# Patient Record
Sex: MALE | Race: OTHER | HISPANIC OR LATINO | URBAN - METROPOLITAN AREA
[De-identification: names, ages, dates, MRNs, and addresses within clinical notes are randomized per-mention and may not be internally consistent; named-entity substitution may affect disease eponyms.]

---

## 2024-07-30 ENCOUNTER — INPATIENT (INPATIENT)
Facility: HOSPITAL | Age: 89
LOS: 2 days | Discharge: HOME | DRG: 379 | End: 2024-08-02
Attending: INTERNAL MEDICINE | Admitting: INTERNAL MEDICINE
Payer: MEDICARE

## 2024-07-30 VITALS
TEMPERATURE: 99 F | SYSTOLIC BLOOD PRESSURE: 164 MMHG | HEIGHT: 62 IN | HEART RATE: 42 BPM | DIASTOLIC BLOOD PRESSURE: 73 MMHG | WEIGHT: 179.9 LBS | RESPIRATION RATE: 14 BRPM

## 2024-07-30 DIAGNOSIS — K92.2 GASTROINTESTINAL HEMORRHAGE, UNSPECIFIED: ICD-10-CM

## 2024-07-30 LAB
ALBUMIN SERPL ELPH-MCNC: 3.9 G/DL — SIGNIFICANT CHANGE UP (ref 3.5–5)
ALP SERPL-CCNC: 106 U/L — SIGNIFICANT CHANGE UP (ref 40–120)
ALT FLD-CCNC: 30 U/L DA — SIGNIFICANT CHANGE UP (ref 10–60)
ANION GAP SERPL CALC-SCNC: 4 MMOL/L — LOW (ref 5–17)
APPEARANCE UR: CLEAR — SIGNIFICANT CHANGE UP
APTT BLD: 36.2 SEC — HIGH (ref 24.5–35.6)
AST SERPL-CCNC: 34 U/L — SIGNIFICANT CHANGE UP (ref 10–40)
BASOPHILS # BLD AUTO: 0.07 K/UL — SIGNIFICANT CHANGE UP (ref 0–0.2)
BASOPHILS # BLD AUTO: 0.08 K/UL — SIGNIFICANT CHANGE UP (ref 0–0.2)
BASOPHILS NFR BLD AUTO: 0.9 % — SIGNIFICANT CHANGE UP (ref 0–2)
BASOPHILS NFR BLD AUTO: 0.9 % — SIGNIFICANT CHANGE UP (ref 0–2)
BILIRUB SERPL-MCNC: 1.5 MG/DL — HIGH (ref 0.2–1.2)
BILIRUB UR-MCNC: NEGATIVE — SIGNIFICANT CHANGE UP
BUN SERPL-MCNC: 20 MG/DL — HIGH (ref 7–18)
CALCIUM SERPL-MCNC: 9.5 MG/DL — SIGNIFICANT CHANGE UP (ref 8.4–10.5)
CHLORIDE SERPL-SCNC: 106 MMOL/L — SIGNIFICANT CHANGE UP (ref 96–108)
CO2 SERPL-SCNC: 29 MMOL/L — SIGNIFICANT CHANGE UP (ref 22–31)
COLOR SPEC: YELLOW — SIGNIFICANT CHANGE UP
CREAT SERPL-MCNC: 1.23 MG/DL — SIGNIFICANT CHANGE UP (ref 0.5–1.3)
DIFF PNL FLD: NEGATIVE — SIGNIFICANT CHANGE UP
EGFR: 57 ML/MIN/1.73M2 — LOW
EOSINOPHIL # BLD AUTO: 0.02 K/UL — SIGNIFICANT CHANGE UP (ref 0–0.5)
EOSINOPHIL # BLD AUTO: 0.03 K/UL — SIGNIFICANT CHANGE UP (ref 0–0.5)
EOSINOPHIL NFR BLD AUTO: 0.2 % — SIGNIFICANT CHANGE UP (ref 0–6)
EOSINOPHIL NFR BLD AUTO: 0.3 % — SIGNIFICANT CHANGE UP (ref 0–6)
GLUCOSE SERPL-MCNC: 114 MG/DL — HIGH (ref 70–99)
GLUCOSE UR QL: NEGATIVE MG/DL — SIGNIFICANT CHANGE UP
HCT VFR BLD CALC: 34.4 % — LOW (ref 39–50)
HCT VFR BLD CALC: 38 % — LOW (ref 39–50)
HCT VFR BLD CALC: 39.9 % — SIGNIFICANT CHANGE UP (ref 39–50)
HGB BLD-MCNC: 11 G/DL — LOW (ref 13–17)
HGB BLD-MCNC: 12.2 G/DL — LOW (ref 13–17)
HGB BLD-MCNC: 12.5 G/DL — LOW (ref 13–17)
IMM GRANULOCYTES NFR BLD AUTO: 0.4 % — SIGNIFICANT CHANGE UP (ref 0–0.9)
IMM GRANULOCYTES NFR BLD AUTO: 0.4 % — SIGNIFICANT CHANGE UP (ref 0–0.9)
INR BLD: 1.96 RATIO — HIGH (ref 0.85–1.18)
KETONES UR-MCNC: NEGATIVE MG/DL — SIGNIFICANT CHANGE UP
LEUKOCYTE ESTERASE UR-ACNC: NEGATIVE — SIGNIFICANT CHANGE UP
LIDOCAIN IGE QN: 36 U/L — SIGNIFICANT CHANGE UP (ref 13–75)
LYMPHOCYTES # BLD AUTO: 1.45 K/UL — SIGNIFICANT CHANGE UP (ref 1–3.3)
LYMPHOCYTES # BLD AUTO: 17.9 % — SIGNIFICANT CHANGE UP (ref 13–44)
LYMPHOCYTES # BLD AUTO: 2.09 K/UL — SIGNIFICANT CHANGE UP (ref 1–3.3)
LYMPHOCYTES # BLD AUTO: 22.3 % — SIGNIFICANT CHANGE UP (ref 13–44)
MCHC RBC-ENTMCNC: 25.8 PG — LOW (ref 27–34)
MCHC RBC-ENTMCNC: 26.2 PG — LOW (ref 27–34)
MCHC RBC-ENTMCNC: 26.5 PG — LOW (ref 27–34)
MCHC RBC-ENTMCNC: 31.3 GM/DL — LOW (ref 32–36)
MCHC RBC-ENTMCNC: 32 GM/DL — SIGNIFICANT CHANGE UP (ref 32–36)
MCHC RBC-ENTMCNC: 32.1 GM/DL — SIGNIFICANT CHANGE UP (ref 32–36)
MCV RBC AUTO: 81.9 FL — SIGNIFICANT CHANGE UP (ref 80–100)
MCV RBC AUTO: 82.3 FL — SIGNIFICANT CHANGE UP (ref 80–100)
MCV RBC AUTO: 82.6 FL — SIGNIFICANT CHANGE UP (ref 80–100)
MONOCYTES # BLD AUTO: 0.84 K/UL — SIGNIFICANT CHANGE UP (ref 0–0.9)
MONOCYTES # BLD AUTO: 0.9 K/UL — SIGNIFICANT CHANGE UP (ref 0–0.9)
MONOCYTES NFR BLD AUTO: 10.3 % — SIGNIFICANT CHANGE UP (ref 2–14)
MONOCYTES NFR BLD AUTO: 9.6 % — SIGNIFICANT CHANGE UP (ref 2–14)
NEUTROPHILS # BLD AUTO: 5.71 K/UL — SIGNIFICANT CHANGE UP (ref 1.8–7.4)
NEUTROPHILS # BLD AUTO: 6.24 K/UL — SIGNIFICANT CHANGE UP (ref 1.8–7.4)
NEUTROPHILS NFR BLD AUTO: 66.5 % — SIGNIFICANT CHANGE UP (ref 43–77)
NEUTROPHILS NFR BLD AUTO: 70.3 % — SIGNIFICANT CHANGE UP (ref 43–77)
NITRITE UR-MCNC: NEGATIVE — SIGNIFICANT CHANGE UP
NRBC # BLD: 0 /100 WBCS — SIGNIFICANT CHANGE UP (ref 0–0)
PH UR: 5 — SIGNIFICANT CHANGE UP (ref 5–8)
PLATELET # BLD AUTO: 162 K/UL — SIGNIFICANT CHANGE UP (ref 150–400)
PLATELET # BLD AUTO: 180 K/UL — SIGNIFICANT CHANGE UP (ref 150–400)
PLATELET # BLD AUTO: 186 K/UL — SIGNIFICANT CHANGE UP (ref 150–400)
POTASSIUM SERPL-MCNC: 3.8 MMOL/L — SIGNIFICANT CHANGE UP (ref 3.5–5.3)
POTASSIUM SERPL-SCNC: 3.8 MMOL/L — SIGNIFICANT CHANGE UP (ref 3.5–5.3)
PROT SERPL-MCNC: 7.5 G/DL — SIGNIFICANT CHANGE UP (ref 6–8.3)
PROT UR-MCNC: 30 MG/DL
PROTHROM AB SERPL-ACNC: 21.9 SEC — HIGH (ref 9.5–13)
RBC # BLD: 4.2 M/UL — SIGNIFICANT CHANGE UP (ref 4.2–5.8)
RBC # BLD: 4.6 M/UL — SIGNIFICANT CHANGE UP (ref 4.2–5.8)
RBC # BLD: 4.85 M/UL — SIGNIFICANT CHANGE UP (ref 4.2–5.8)
RBC # FLD: 17 % — HIGH (ref 10.3–14.5)
RBC # FLD: 17 % — HIGH (ref 10.3–14.5)
RBC # FLD: 17.1 % — HIGH (ref 10.3–14.5)
SODIUM SERPL-SCNC: 139 MMOL/L — SIGNIFICANT CHANGE UP (ref 135–145)
SP GR SPEC: 1.01 — SIGNIFICANT CHANGE UP (ref 1–1.03)
UROBILINOGEN FLD QL: 0.2 MG/DL — SIGNIFICANT CHANGE UP (ref 0.2–1)
WBC # BLD: 10.17 K/UL — SIGNIFICANT CHANGE UP (ref 3.8–10.5)
WBC # BLD: 8.12 K/UL — SIGNIFICANT CHANGE UP (ref 3.8–10.5)
WBC # BLD: 9.38 K/UL — SIGNIFICANT CHANGE UP (ref 3.8–10.5)
WBC # FLD AUTO: 10.17 K/UL — SIGNIFICANT CHANGE UP (ref 3.8–10.5)
WBC # FLD AUTO: 8.12 K/UL — SIGNIFICANT CHANGE UP (ref 3.8–10.5)
WBC # FLD AUTO: 9.38 K/UL — SIGNIFICANT CHANGE UP (ref 3.8–10.5)

## 2024-07-30 PROCEDURE — 99285 EMERGENCY DEPT VISIT HI MDM: CPT

## 2024-07-30 PROCEDURE — 74177 CT ABD & PELVIS W/CONTRAST: CPT | Mod: 26,MC

## 2024-07-30 RX ORDER — BACTERIOSTATIC SODIUM CHLORIDE 0.9 %
1000 VIAL (ML) INJECTION ONCE
Refills: 0 | Status: COMPLETED | OUTPATIENT
Start: 2024-07-30 | End: 2024-07-30

## 2024-07-30 RX ORDER — ONDANSETRON HCL/PF 4 MG/2 ML
4 VIAL (ML) INJECTION EVERY 8 HOURS
Refills: 0 | Status: DISCONTINUED | OUTPATIENT
Start: 2024-07-30 | End: 2024-08-02

## 2024-07-30 RX ORDER — ACETAMINOPHEN 500 MG
650 TABLET ORAL EVERY 6 HOURS
Refills: 0 | Status: DISCONTINUED | OUTPATIENT
Start: 2024-07-30 | End: 2024-08-02

## 2024-07-30 RX ORDER — PANTOPRAZOLE SODIUM 20 MG/1
40 TABLET, DELAYED RELEASE ORAL
Refills: 0 | Status: DISCONTINUED | OUTPATIENT
Start: 2024-07-30 | End: 2024-08-02

## 2024-07-30 RX ORDER — MELATONIN 3 MG
3 TABLET ORAL AT BEDTIME
Refills: 0 | Status: DISCONTINUED | OUTPATIENT
Start: 2024-07-30 | End: 2024-08-02

## 2024-07-30 RX ORDER — MAGNESIUM, ALUMINUM HYDROXIDE 200-225/5
30 SUSPENSION, ORAL (FINAL DOSE FORM) ORAL EVERY 4 HOURS
Refills: 0 | Status: DISCONTINUED | OUTPATIENT
Start: 2024-07-30 | End: 2024-08-02

## 2024-07-30 RX ADMIN — Medication 1000 MILLILITER(S): at 20:55

## 2024-07-30 RX ADMIN — PANTOPRAZOLE SODIUM 40 MILLIGRAM(S): 20 TABLET, DELAYED RELEASE ORAL at 20:30

## 2024-07-30 RX ADMIN — Medication 1000 MILLILITER(S): at 19:28

## 2024-07-30 NOTE — H&P ADULT - PROBLEM SELECTOR PLAN 1
p/w 6-8 episodes of bloody BMs over 24 hours, no prev hx of GI bleed  CTAP: High density material layering within the descending colon, concerning for active bleeding. colonic diverticulosis.  per GI discussion with ED, bleeding likely iso diverticular bleeding  Hgb 12.5 > 12.2 > 11  HR 68, /81, s/p 1 L NS in ED  CLD  PPI IV BID  continue to monitor Hgb  GI consult in AM

## 2024-07-30 NOTE — H&P ADULT - NSHPREVIEWOFSYSTEMS_GEN_ALL_CORE
- CONSTITUTIONAL: Denies fever and chills  - HEENT: Denies changes in vision and hearing.  - RESPIRATORY: Denies SOB and cough.  - CV: Denies chest pain and palpitations  - GI: Denies abdominal pain, nausea, vomiting +bloody diarrhea  - : Denies dysuria and urinary frequency.  - SKIN: Denies rash and pruritus.  - NEUROLOGICAL: Denies headache and syncope.  - PSYCHIATRIC: Denies recent changes in mood. Denies anxiety and depression.

## 2024-07-30 NOTE — H&P ADULT - PROBLEM SELECTOR PLAN 2
hx of HTN on Valsartan-HCTZ and Lopressor  holding home BP meds iso bradycardia and GI bleed  monitor BP

## 2024-07-30 NOTE — PATIENT PROFILE ADULT - FALL HARM RISK - RISK INTERVENTIONS
Assistance OOB with selected safe patient handling equipment/Communicate Fall Risk and Risk Factors to all staff, patient, and family/Discuss with provider need for PT consult/Monitor for mental status changes/Monitor gait and stability/Provide patient with walking aids - walker, cane, crutches/Reinforce activity limits and safety measures with patient and family/Reorient to person, place and time as needed/Review medications for side effects contributing to fall risk/Sit up slowly, dangle for a short time, stand at bedside before walking/Use of alarms - bed, chair and/or voice tab/Visual Cue: Yellow wristband/Bed in lowest position, wheels locked, appropriate side rails in place/Call bell, personal items and telephone in reach/Instruct patient to call for assistance before getting out of bed or chair/Non-slip footwear when patient is out of bed/[place] to call system/Physically safe environment - no spills, clutter or unnecessary equipment/Purposeful Proactive Rounding/Room/bathroom lighting operational, light cord in reach

## 2024-07-30 NOTE — H&P ADULT - HISTORY OF PRESENT ILLNESS
88-year-old male Korean-speaking Renéebob Canales used and translation.  Patient's daughter at bedside to give collateral information.  Patient with past medical history of diabetes, hypertension, hyperlipidemia, CVA on Xarelto presents with bloody stool for 1 day.  As per daughter this morning he had a bowel movement and noticed toilet bowl filled with blood.  Denies any pain with bowel movement.  Daughter reporting stool is normal brown.  Denies similar symptoms previously.  Denies any straining with moving bowels.  No chest pain, no shortness of breath, no abdominal pain, no weakness, no dizziness  Admitted for GI Bleed, likely diverticular.  Pt is an 88-year-old male Urdu-speaking. w/ PMHx of pre-diabetes, hypertension, hyperlipidemia, CVA on Xarelto, and gout who p/w multiple bloody. liquidy bowel movements over the last 24 hours. Pt has photos of BMs and toilet bowl appears full with dark red blood and loose stool.       presents with bloody stool for 1 day.  Granddaughter at bedside providing translation. As per daughter this morning he had a bowel movement and noticed toilet bowl filled with blood.  Denies any pain with bowel movement.  Daughter reporting stool is normal brown.  Denies similar symptoms previously.  Denies any straining with moving bowels.  No chest pain, no shortness of breath, no abdominal pain, no weakness, no dizziness  Admitted for GI Bleed, likely diverticular.  Pt is an 88-year-old male AAOx3, walks independently, Argentine-speaking. w/ PMHx of pre-diabetes, hypertension, hyperlipidemia, CVA on Xarelto, and gout who p/w multiple bloody, liquidy bowel movements over the last 24 hours. Pts granddaughter at bedside has photos of BMs and toilet bowl appears full with dark red blood and loose stool. Earlier in the morning on 7/30, pt  had a bowel movement and noticed toilet bowl filled with blood.  Denies any pain with bowel movement. Denies similar symptoms previously. Denies any straining with moving bowels.  No chest pain, no shortness of breath, no abdominal pain, no weakness, no dizziness. In ED, pt reports 6-8 additional, similar BMs. Pt underwent colonoscopy once after he turned 52 and does not remember any abnormal findings at that time. Pt denies any recent abx use, fevers, chills, sick contacts, or travel. Hgb 12.5 > 12.2 in ED. Admitted for GI Bleed, likely diverticular.

## 2024-07-30 NOTE — H&P ADULT - NSICDXPASTMEDICALHX_GEN_ALL_CORE_FT
PAST MEDICAL HISTORY:  CVA (cerebrovascular accident)     Gout     Hyperlipidemia     Hypertension

## 2024-07-30 NOTE — ED PROVIDER NOTE - PROGRESS NOTE DETAILS
CT showing High density material layering within the descending colon, concerning   for active bleeding. No GI on call at [facility] at this time.  Transfer center contacted for transfer. GI contacted.  Likely diverticular bleed.  Patient hemodynamically stable.  Do not recommend transfer at this time.  Recommend patient stay here at [facility] for GI eval. Repeat CBC stable.  Will admit GI contacted.  Likely diverticular bleed.  Patient hemodynamically stable.  Do not recommend transfer at this time.  recommend holding Xarelto have patient on clear liquid diet.   Recommend patient stay here at [facility] for GI eval.

## 2024-07-30 NOTE — ED PROVIDER NOTE - OBJECTIVE STATEMENT
88-year-old male Sinhala-speaking Renée Canales used and translation.  Patient's daughter at bedside to give collateral information.  Patient with past medical history of diabetes, hypertension, hyperlipidemia, CVA on Xarelto presents with bloody stool for 1 day.  As per daughter this morning he had a bowel movement and noticed toilet bowl filled with blood.  Denies any pain with bowel movement.  Daughter reporting stool is normal brown.  Denies similar symptoms previously.  Denies any straining with moving bowels.  No chest pain, no shortness of breath, no abdominal pain, no weakness, no dizziness

## 2024-07-30 NOTE — H&P ADULT - PROBLEM SELECTOR PLAN 3
hx of gout with most recent flare up 2 weeks ago in R wrist  no swelling or erythema noted on exam today  c/w home med Colchicine 0.6 mg daily

## 2024-07-30 NOTE — H&P ADULT - ASSESSMENT
Pt is an 88-year-old male AAOx3, walks independently, Canadian-speaking. w/ PMHx of pre-diabetes, hypertension, hyperlipidemia, CVA on Xarelto, and gout who p/w multiple bloody, liquidy bowel movements over the last 24 hours. Pts granddaughter at bedside has photos of BMs and toilet bowl appears full with dark red blood and loose stool. Earlier in the morning on 7/30, pt  had a bowel movement and noticed toilet bowl filled with blood.  Denies any pain with bowel movement. Denies similar symptoms previously. Denies any straining with moving bowels.  No chest pain, no shortness of breath, no abdominal pain, no weakness, no dizziness. In ED, pt reports 6-8 additional, similar BMs. Pt underwent colonoscopy once after he turned 52 and does not remember any abnormal findings at that time. Pt denies any recent abx use, fevers, chills, sick contacts, or travel. Hgb 12.5 > 12.2 in ED. Admitted for GI Bleed, likely diverticular.     INCOMPLETE MED LIST PROVIDED BY GRANDDAUGHTER AT BEDSIDE. PRIMARY TEAM TO CONFIRM MED REC IN AM (instructed family to bring list from home in AM)

## 2024-07-30 NOTE — ED PROVIDER NOTE - CLINICAL SUMMARY MEDICAL DECISION MAKING FREE TEXT BOX
88-year-old male presents with rectal bleeding for 1 day.  Patient is on Xarelto.  Concern for active GI bleed versus possible diverticulitis.  Will get labs, CAT scan, fluids, reassess

## 2024-07-31 DIAGNOSIS — E78.5 HYPERLIPIDEMIA, UNSPECIFIED: ICD-10-CM

## 2024-07-31 DIAGNOSIS — Z75.8 OTHER PROBLEMS RELATED TO MEDICAL FACILITIES AND OTHER HEALTH CARE: ICD-10-CM

## 2024-07-31 DIAGNOSIS — I63.9 CEREBRAL INFARCTION, UNSPECIFIED: ICD-10-CM

## 2024-07-31 DIAGNOSIS — Z29.9 ENCOUNTER FOR PROPHYLACTIC MEASURES, UNSPECIFIED: ICD-10-CM

## 2024-07-31 DIAGNOSIS — K92.2 GASTROINTESTINAL HEMORRHAGE, UNSPECIFIED: ICD-10-CM

## 2024-07-31 DIAGNOSIS — M10.9 GOUT, UNSPECIFIED: ICD-10-CM

## 2024-07-31 DIAGNOSIS — Z87.438 PERSONAL HISTORY OF OTHER DISEASES OF MALE GENITAL ORGANS: ICD-10-CM

## 2024-07-31 DIAGNOSIS — Z87.898 PERSONAL HISTORY OF OTHER SPECIFIED CONDITIONS: ICD-10-CM

## 2024-07-31 DIAGNOSIS — I10 ESSENTIAL (PRIMARY) HYPERTENSION: ICD-10-CM

## 2024-07-31 LAB
ALBUMIN SERPL ELPH-MCNC: 2.9 G/DL — LOW (ref 3.5–5)
ALP SERPL-CCNC: 79 U/L — SIGNIFICANT CHANGE UP (ref 40–120)
ALT FLD-CCNC: 28 U/L DA — SIGNIFICANT CHANGE UP (ref 10–60)
ANION GAP SERPL CALC-SCNC: 6 MMOL/L — SIGNIFICANT CHANGE UP (ref 5–17)
APTT BLD: 32.2 SEC — SIGNIFICANT CHANGE UP (ref 24.5–35.6)
AST SERPL-CCNC: 33 U/L — SIGNIFICANT CHANGE UP (ref 10–40)
BILIRUB SERPL-MCNC: 1.3 MG/DL — HIGH (ref 0.2–1.2)
BUN SERPL-MCNC: 21 MG/DL — HIGH (ref 7–18)
CALCIUM SERPL-MCNC: 8.6 MG/DL — SIGNIFICANT CHANGE UP (ref 8.4–10.5)
CHLORIDE SERPL-SCNC: 108 MMOL/L — SIGNIFICANT CHANGE UP (ref 96–108)
CO2 SERPL-SCNC: 29 MMOL/L — SIGNIFICANT CHANGE UP (ref 22–31)
CREAT SERPL-MCNC: 1.11 MG/DL — SIGNIFICANT CHANGE UP (ref 0.5–1.3)
EGFR: 65 ML/MIN/1.73M2 — SIGNIFICANT CHANGE UP
GLUCOSE BLDC GLUCOMTR-MCNC: 107 MG/DL — HIGH (ref 70–99)
GLUCOSE BLDC GLUCOMTR-MCNC: 115 MG/DL — HIGH (ref 70–99)
GLUCOSE BLDC GLUCOMTR-MCNC: 118 MG/DL — HIGH (ref 70–99)
GLUCOSE SERPL-MCNC: 102 MG/DL — HIGH (ref 70–99)
HCT VFR BLD CALC: 30.6 % — LOW (ref 39–50)
HCT VFR BLD CALC: 30.8 % — LOW (ref 39–50)
HGB BLD-MCNC: 9.7 G/DL — LOW (ref 13–17)
HGB BLD-MCNC: 9.8 G/DL — LOW (ref 13–17)
INR BLD: 1.82 RATIO — HIGH (ref 0.85–1.18)
MAGNESIUM SERPL-MCNC: 1.9 MG/DL — SIGNIFICANT CHANGE UP (ref 1.6–2.6)
MCHC RBC-ENTMCNC: 26.1 PG — LOW (ref 27–34)
MCHC RBC-ENTMCNC: 26.2 PG — LOW (ref 27–34)
MCHC RBC-ENTMCNC: 31.7 GM/DL — LOW (ref 32–36)
MCHC RBC-ENTMCNC: 31.8 GM/DL — LOW (ref 32–36)
MCV RBC AUTO: 81.9 FL — SIGNIFICANT CHANGE UP (ref 80–100)
MCV RBC AUTO: 82.7 FL — SIGNIFICANT CHANGE UP (ref 80–100)
NRBC # BLD: 0 /100 WBCS — SIGNIFICANT CHANGE UP (ref 0–0)
NRBC # BLD: 0 /100 WBCS — SIGNIFICANT CHANGE UP (ref 0–0)
PHOSPHATE SERPL-MCNC: 3.6 MG/DL — SIGNIFICANT CHANGE UP (ref 2.5–4.5)
PLATELET # BLD AUTO: 141 K/UL — LOW (ref 150–400)
PLATELET # BLD AUTO: 157 K/UL — SIGNIFICANT CHANGE UP (ref 150–400)
POTASSIUM SERPL-MCNC: 3.6 MMOL/L — SIGNIFICANT CHANGE UP (ref 3.5–5.3)
POTASSIUM SERPL-SCNC: 3.6 MMOL/L — SIGNIFICANT CHANGE UP (ref 3.5–5.3)
PROT SERPL-MCNC: 5.9 G/DL — LOW (ref 6–8.3)
PROTHROM AB SERPL-ACNC: 20.4 SEC — HIGH (ref 9.5–13)
RBC # BLD: 3.7 M/UL — LOW (ref 4.2–5.8)
RBC # BLD: 3.76 M/UL — LOW (ref 4.2–5.8)
RBC # FLD: 17.1 % — HIGH (ref 10.3–14.5)
RBC # FLD: 17.2 % — HIGH (ref 10.3–14.5)
SODIUM SERPL-SCNC: 143 MMOL/L — SIGNIFICANT CHANGE UP (ref 135–145)
WBC # BLD: 7.11 K/UL — SIGNIFICANT CHANGE UP (ref 3.8–10.5)
WBC # BLD: 8.55 K/UL — SIGNIFICANT CHANGE UP (ref 3.8–10.5)
WBC # FLD AUTO: 7.11 K/UL — SIGNIFICANT CHANGE UP (ref 3.8–10.5)
WBC # FLD AUTO: 8.55 K/UL — SIGNIFICANT CHANGE UP (ref 3.8–10.5)

## 2024-07-31 PROCEDURE — 74174 CTA ABD&PLVS W/CONTRAST: CPT | Mod: 26

## 2024-07-31 RX ORDER — COLCHICINE 0.6 MG/1
0.6 TABLET, FILM COATED ORAL DAILY
Refills: 0 | Status: DISCONTINUED | OUTPATIENT
Start: 2024-07-31 | End: 2024-08-02

## 2024-07-31 RX ORDER — ATORVASTATIN CALCIUM 40 MG/1
40 TABLET, FILM COATED ORAL AT BEDTIME
Refills: 0 | Status: DISCONTINUED | OUTPATIENT
Start: 2024-07-31 | End: 2024-08-02

## 2024-07-31 RX ORDER — SOD SULF/SODIUM/NAHCO3/KCL/PEG
4000 SOLUTION, RECONSTITUTED, ORAL ORAL ONCE
Refills: 0 | Status: COMPLETED | OUTPATIENT
Start: 2024-07-31 | End: 2024-07-31

## 2024-07-31 RX ORDER — INSULIN LISPRO 100/ML
VIAL (ML) SUBCUTANEOUS AT BEDTIME
Refills: 0 | Status: DISCONTINUED | OUTPATIENT
Start: 2024-07-31 | End: 2024-08-02

## 2024-07-31 RX ORDER — ATORVASTATIN CALCIUM 40 MG/1
1 TABLET, FILM COATED ORAL
Refills: 0 | DISCHARGE

## 2024-07-31 RX ORDER — TAMSULOSIN HCL 0.4 MG
0.4 CAPSULE ORAL AT BEDTIME
Refills: 0 | Status: DISCONTINUED | OUTPATIENT
Start: 2024-07-31 | End: 2024-08-02

## 2024-07-31 RX ORDER — INSULIN LISPRO 100/ML
VIAL (ML) SUBCUTANEOUS
Refills: 0 | Status: DISCONTINUED | OUTPATIENT
Start: 2024-07-31 | End: 2024-08-02

## 2024-07-31 RX ADMIN — Medication 0.4 MILLIGRAM(S): at 21:18

## 2024-07-31 RX ADMIN — PANTOPRAZOLE SODIUM 40 MILLIGRAM(S): 20 TABLET, DELAYED RELEASE ORAL at 17:35

## 2024-07-31 RX ADMIN — Medication 3 MILLIGRAM(S): at 21:18

## 2024-07-31 RX ADMIN — PANTOPRAZOLE SODIUM 40 MILLIGRAM(S): 20 TABLET, DELAYED RELEASE ORAL at 05:31

## 2024-07-31 RX ADMIN — Medication 4000 MILLILITER(S): at 17:34

## 2024-07-31 RX ADMIN — ATORVASTATIN CALCIUM 40 MILLIGRAM(S): 40 TABLET, FILM COATED ORAL at 21:18

## 2024-07-31 NOTE — PROGRESS NOTE ADULT - ASSESSMENT
Pt is an 88-year-old male AAOx3, walks independently, Gambian-speaking. w/ PMHx of pre-diabetes, hypertension, hyperlipidemia, CVA on Xarelto, and gout who p/w multiple dark red  bloody, liquidly bowel movements over the last 24 hours.   In ED, pt reports 6-8 additional, similar BMs. Pt underwent colonoscopy once after he turned 52 and does not remember any abnormal findings at that time.  Hgb 12.5 > 12.2 in ED. Admitted for GI Bleed, likely diverticular bleed.  CT A/P shows High density material layering within the descending colon, concerning for active bleeding. Colonic diverticulosis. GI consulted.       Pt is an 88-year-old male AAOx3, walks independently, [place] Islander-speaking. w/ PMHx of pre-diabetes, hypertension, hyperlipidemia, CVA on Xarelto, and gout who p/w multiple dark red  bloody, liquidly bowel movements over the last 24 hours.   In ED, pt reports 6-8 additional, similar BMs. Pt underwent colonoscopy once after he turned 52 and does not remember any abnormal findings at that time.  Hgb 12.5 > 12.2 in ED. Admitted for GI Bleed, likely diverticular bleed.  CT A/P shows High density material layering within the descending colon, concerning for active bleeding. Colonic diverticulosis. GI consulted. Tentative colonoscopy in AM of 8/1

## 2024-07-31 NOTE — CONSULT NOTE ADULT - SUBJECTIVE AND OBJECTIVE BOX
[  ] STAT REQUEST              [ X ] ROUTINE REQUEST    Patient is a 88 year old male with Rectal bleeding. GI consulted to evaluate.          HPI:  Pt is an 88-year-old male AAOx3, walks independently, Malawian-speaking. with past medical history significant for hypertension, hyperlipidemia, CVA on Xarelto, and gout who presented to the emergency room with one day history of progressively worsening watery diarrhea mixed with blood. Patient denies abdominal pain, nausea, vomiting, hematemesis, melena, fever, chills, chest pain, SOB, cough, hematuria, dysuria, recent traveling or antibiotics intake.          PAIN MANAGEMENT:  Pain Scale:                0 /10  Pain Location:      Prior Colonoscopy: 30 years ago    PAST MEDICAL HISTORY    Gout    Hypertension    Hyperlipidemia    CVA (cerebrovascular accident)        PAST SURGICAL HISTORY    No significant surgical history reported        Allergies    No Known Allergies    Intolerances  None         MEDICATIONS  (STANDING):  atorvastatin 40 milliGRAM(s) Oral at bedtime  colchicine 0.6 milliGRAM(s) Oral daily  insulin lispro (ADMELOG) corrective regimen sliding scale   SubCutaneous three times a day before meals  insulin lispro (ADMELOG) corrective regimen sliding scale   SubCutaneous at bedtime  pantoprazole  Injectable 40 milliGRAM(s) IV Push two times a day  tamsulosin 0.4 milliGRAM(s) Oral at bedtime    MEDICATIONS  (PRN):  acetaminophen     Tablet .. 650 milliGRAM(s) Oral every 6 hours PRN Temp greater or equal to 38C (100.4F), Mild Pain (1 - 3)  aluminum hydroxide/magnesium hydroxide/simethicone Suspension 30 milliLiter(s) Oral every 4 hours PRN Dyspepsia  melatonin 3 milliGRAM(s) Oral at bedtime PRN Insomnia  ondansetron Injectable 4 milliGRAM(s) IV Push every 8 hours PRN Nausea and/or Vomiting      SOCIAL HISTORY  Advanced Directives:       [X  ] Full Code       [  ] DNR  Marital Status:         [  ] M      [ X ] S      [  ] D       [  ] W  Children:       [ X ] Yes      [  ] No  Occupation:        [  ] Employed       [X  ] Unemployed       [  ] Retired  Diet:       [ X ] Regular       [  ] PEG feeding          [  ] NG tube feeding  Drug Use:           [  X] Patient denied          [  ] Yes  Alcohol:           [ X ] No             [  ] Yes (socially)         [  ] Yes (chronic)  Tobacco:           [  ] Yes           [ X ] No      FAMILY HISTORY  [ X ] Heart Disease            [ X ] Diabetes             [ X ] HTN             [  ] Colon Cancer             [  ] Stomach Cancer              [  ] Pancreatic Cancer      VITAL SIGNS   Vital Signs Last 24 Hrs  T(C): 36.5 (24 @ 05:37), Max: 37 (24 @ 14:29)  T(F): 97.7 (24 @ 05:37), Max: 98.6 (24 @ 14:29)  HR: 61 (24 @ 05:37) (42 - 68)  BP: 126/50 (24 @ 05:37) (126/50 - 165/75)  BP(mean): 68 (24 @ 05:37) (68 - 87)  RR: 18 (24 @ 05:37) (14 - 18)  SpO2: 99% (24 @ 05:37) (98% - 100%)  Daily Height in cm: 157.48 (2024 14:29)    Daily Weight in k.1 (2024 05:37)       CBC Full  -  ( 2024 06:10 )  WBC Count : 7.11 K/uL  RBC Count : 3.70 M/uL  Hemoglobin : 9.7 g/dL  Hematocrit : 30.6 %  Platelet Count - Automated : 141 K/uL  Mean Cell Volume : 82.7 fl  Mean Cell Hemoglobin : 26.2 pg  Mean Cell Hemoglobin Concentration : 31.7 gm/dL  Auto Neutrophil # : x  Auto Lymphocyte # : x  Auto Monocyte # : x  Auto Eosinophil # : x  Auto Basophil # : x  Auto Neutrophil % : x  Auto Lymphocyte % : x  Auto Monocyte % : x  Auto Eosinophil % : x  Auto Basophil % : x          143  |  108  |  21<H>  ----------------------------<  102<H>  3.6   |  29  |  1.11    Ca    8.6      2024 06:10  Phos  3.6       Mg     1.9         TPro  5.9<L>  /  Alb  2.9<L>  /  TBili  1.3<H>  /  DBili  x   /  AST  33  /  ALT  28  /  AlkPhos  79      Lipase: 36 U/L ( @ 16:00)      PT/INR - ( 2024 06:10 )   PT: 20.4 sec;   INR: 1.82 ratio       PTT - ( 2024 06:10 )  PTT:32.2 sec      Urinalysis with Rflx Culture (24 @ 16:20)   Urine Appearance: Clear  Color: Yellow  Specific Gravity: 1.014  pH Urine: 5.0  Protein, Urine: 30 mg/dL  Glucose Qualitative, Urine: Negative mg/dL  Ketone - Urine: Negative mg/dL  Blood, Urine: Negative  Bilirubin: Negative  Urobilinogen: 0.2 mg/dL  Leukocyte Esterase Concentration: Negative  Nitrite: Negative    RADIOLOGY/IMAGING                    ACC: [id number] EXAM:  CT ABDOMEN AND PELVIS IC   ORDERED BY: KAYLA RECIO     PROCEDURE DATE:  2024          INTERPRETATION:  CLINICAL INFORMATION: Rectal bleeding, rule out   diverticulitis    COMPARISON: None.    CONTRAST/COMPLICATIONS:  IV Contrast: Omnipaque 350  90 cc administered   10 cc discarded  Oral Contrast: NONE  Complications: None reported at time of study completion    PROCEDURE:  CT of the Abdomen and Pelvis was performed.  Sagittal and coronal reformats were performed.    FINDINGS:  LOWER CHEST: Elevation of the left hemidiaphragm.    LIVER: Within normal limits.  BILE DUCTS: Normal caliber.  GALLBLADDER: Within normal limits.  SPLEEN: Within normal limits.  PANCREAS: Within normal limits.  ADRENALS: Within normal limits.  KIDNEYS/URETERS: Bilateral renal cysts. Additional left subcentimeter   hypodensities, too small to characterize. No hydronephrosis.    BLADDER: Minimally distended.  REPRODUCTIVE ORGANS: Prostate within normal limits.    BOWEL: High density layering within the descending colon. No bowel   obstruction. Colonic diverticulosis. Appendix is normal.  PERITONEUM/RETROPERITONEUM: Within normal limits.  VESSELS: Atherosclerotic changes.  LYMPH NODES: No lymphadenopathy.  ABDOMINAL WALL: Withinnormal limits.  BONES: Degenerative changes.    IMPRESSION:    High density material layering within the descending colon, concerning   for active bleeding.    Colonic diverticulosis.

## 2024-07-31 NOTE — PHARMACOTHERAPY INTERVENTION NOTE - COMMENTS
Updated the home medication list on "Outpatient Medication Review" as per the family member- Nely, who provided picture of medication bottles.

## 2024-07-31 NOTE — PHARMACOTHERAPY INTERVENTION NOTE - COMMENTS
Provided medication counseling on pt's new medications and on their side effects.  Pt is Peruvian speaking, but family member was by the bedside and preferred to interpret herself.  All the questions were answered.

## 2024-07-31 NOTE — PROGRESS NOTE ADULT - PROBLEM SELECTOR PLAN 1
p/w 6-8 episodes of bloody BMs over 24 hours, no prev hx of GI bleed  CTAP: High density material layering within the descending colon, concerning for active bleeding. colonic diverticulosis.  per GI discussion with ED, bleeding likely iso diverticular bleeding  Hgb 12.5 > 12.2 > 11 > 9.7  V/S wnl, Hemodynamically stable, however given drop in hgb  IR consulted > IR deferred consult back to GI as no acute intervention is warranted of them   NPO with PPI IV BID  Monitor H&H q6 hours  Maintain active T&S  GI consulted Dr. Mercedes p/w 6-8 episodes of bloody BMs over 24 hours, no prev hx of GI bleed  CTAP: High density material layering within the descending colon, concerning for active bleeding. colonic diverticulosis.  per GI discussion with ED, bleeding likely iso diverticular bleeding  Hgb 12.5 > 12.2 > 11 > 9.7  V/S wnl, Hemodynamically stable, however given drop in hgb  IR consulted > IR deferred consult back to GI as no acute intervention is warranted of them   F/U CT angio  CLD with PPI IV BID  Start Golytely 1 gal at 1700 today  NPO after MN for colonoscopy  Tentative colonoscopy in AM of 8/1  Monitor H&H q6 hours  Maintain active T&S  GI consulted Dr. Mercedes

## 2024-07-31 NOTE — CONSULT NOTE ADULT - SUBJECTIVE AND OBJECTIVE BOX
[  ] STAT REQUEST              [ X ] ROUTINE REQUEST    Patient is a 88 year old male with rectal bleeding. GI consulted to evaluate.         HPI:  Pt is an 88-year-old male AAOx3, walks independently, Marshallese-speaking. w/ PMHx of pre-diabetes, hypertension, hyperlipidemia, CVA on Xarelto, and gout who p/w multiple bloody, liquidy bowel movements over the last 24 hours. Pts granddaughter at bedside has photos of BMs and toilet bowl appears full with dark red blood and loose stool. Earlier in the morning on , pt  had a bowel movement and noticed toilet bowl filled with blood.  Denies any pain with bowel movement. Denies similar symptoms previously. Denies any straining with moving bowels.  No chest pain, no shortness of breath, no abdominal pain, no weakness, no dizziness. In ED, pt reports 6-8 additional, similar BMs. Pt underwent colonoscopy once after he turned 52 and does not remember any abnormal findings at that time. Pt denies any recent abx use, fevers, chills, sick contacts, or travel. Hgb 12.5 > 12.2 in ED. Admitted for GI Bleed, likely diverticular.  (2024 21:18)      PAIN MANAGEMENT:  Pain Scale:                 /10  Pain Location:      Prior Colonoscopy:    PAST MEDICAL HISTORY  Gout    Hypertension    Hyperlipidemia    CVA (cerebrovascular accident)        PAST SURGICAL HISTORY      Allergies    No Known Allergies    Intolerances        HOME MEDICATIONS    MEDICATIONS  (STANDING):  atorvastatin 40 milliGRAM(s) Oral at bedtime  colchicine 0.6 milliGRAM(s) Oral daily  insulin lispro (ADMELOG) corrective regimen sliding scale   SubCutaneous three times a day before meals  insulin lispro (ADMELOG) corrective regimen sliding scale   SubCutaneous at bedtime  pantoprazole  Injectable 40 milliGRAM(s) IV Push two times a day  tamsulosin 0.4 milliGRAM(s) Oral at bedtime    MEDICATIONS  (PRN):  acetaminophen     Tablet .. 650 milliGRAM(s) Oral every 6 hours PRN Temp greater or equal to 38C (100.4F), Mild Pain (1 - 3)  aluminum hydroxide/magnesium hydroxide/simethicone Suspension 30 milliLiter(s) Oral every 4 hours PRN Dyspepsia  melatonin 3 milliGRAM(s) Oral at bedtime PRN Insomnia  ondansetron Injectable 4 milliGRAM(s) IV Push every 8 hours PRN Nausea and/or Vomiting      SOCIAL HISTORY  Advanced Directives:       [  ] Full Code       [  ] DNR  Marital Status:         [  ] M      [  ] S      [  ] D       [  ] W  Children:       [  ] Yes      [  ] No  Occupation:        [  ] Employed       [  ] Unemployed       [  ] Retired  Diet:       [  ] Regular       [  ] PEG feeding          [  ] NG tube feeding  Drug Use:           [  ] Patient denied          [  ] Yes  Alcohol:           [  ] No             [  ] Yes (socially)         [  ] Yes (chronic)  Tobacco:           [  ] Yes           [  ] No    FAMILY HISTORY  [  ] Heart Disease            [  ] Diabetes             [  ] HTN             [  ] Colon Cancer             [  ] Stomach Cancer              [  ] Pancreatic Cancer    VITAL SIGNS   Vital Signs Last 24 Hrs  T(C): 36.5 (24 @ 05:37), Max: 37 (24 @ 14:29)  T(F): 97.7 (24 @ 05:37), Max: 98.6 (24 @ 14:29)  HR: 61 (24 @ 05:37) (42 - 68)  BP: 126/50 (24 @ 05:37) (126/50 - 165/75)  BP(mean): 68 (24 @ 05:37) (68 - 87)  RR: 18 (24 @ 05:37) (14 - 18)  SpO2: 99% (24 @ 05:37) (98% - 100%)  Daily Height in cm: 157.48 (2024 14:29)    Daily Weight in k.1 (2024 05:37)       CBC Full  -  ( 2024 06:10 )  WBC Count : 7.11 K/uL  RBC Count : 3.70 M/uL  Hemoglobin : 9.7 g/dL  Hematocrit : 30.6 %  Platelet Count - Automated : 141 K/uL  Mean Cell Volume : 82.7 fl  Mean Cell Hemoglobin : 26.2 pg  Mean Cell Hemoglobin Concentration : 31.7 gm/dL  Auto Neutrophil # : x  Auto Lymphocyte # : x  Auto Monocyte # : x  Auto Eosinophil # : x  Auto Basophil # : x  Auto Neutrophil % : x  Auto Lymphocyte % : x  Auto Monocyte % : x  Auto Eosinophil % : x  Auto Basophil % : x          143  |  108  |  21<H>  ----------------------------<  102<H>  3.6   |  29  |  1.11    Ca    8.6      2024 06:10  Phos  3.6       Mg     1.9         TPro  5.9<L>  /  Alb  2.9<L>  /  TBili  1.3<H>  /  DBili  x   /  AST  33  /  ALT  28  /  AlkPhos  79        Lipase: 36 U/L ( @ 16:00)      ALT/SGPT 28  Albumin, Serum 2.9  Alkaline Phosphatase 79  AST/SGOT 33  Bilirubin Direct --  Bilirubin Total 1.3  Indirect Bilirubin --  Hepatitis A Total --  Hepatitis B Core Antibody --  Hepatitis B Surface Antibody --  Hepatitis B Surface Antigen --  Hepatitis C Virus Interpretation --  Hepatitis C Virus Genotype --  ALT/SGPT 30  Albumin, Serum 3.9  Alkaline Phosphatase 106  AST/SGOT 34  Bilirubin Direct --  Bilirubin Total 1.5  Indirect Bilirubin --  Hepatitis A Total --  Hepatitis B Core Antibody --  Hepatitis B Surface Antibody --  Hepatitis B Surface Antigen --  Hepatitis C Virus Interpretation --  Hepatitis C Virus Genotype --          PT/INR - ( 2024 06:10 )   PT: 20.4 sec;   INR: 1.82 ratio         PTT - ( 2024 06:10 )  PTT:32.2 sec    RADIOLOGY/IMAGING

## 2024-07-31 NOTE — CONSULT NOTE ADULT - NS ATTEND AMEND GEN_ALL_CORE FT
I reviewed the above and edited where appropriate.  Chart and relevant imaging reviewed.    88M with multiple comorbidities as above, who p/w GIB, found to have high density material in descending colon on a routine CT A/P, concerning for active bleeding.    At the time of consult, patient remains hemodynamically stable with normal BP and HR. Patient also has no received any blood products.     First line therapy in a hemodynamically stable patient with active bleeding is medical resuscitation and rapid prep for colonoscopy.   Recommend continued aggressive resuscitation, type/cross RBCs, serial CBCs and admission to ICU if needed.   Transfuse PRBCs if needed (recommend 2:1:1 with platelets and factors).    Recommend holding Xarelto and reversing coagulopathy (current INR is 1.82).    Angiography/embolization deferred at this time, as most LGIBs will respond to appropriate medical/endoscopic therapy.     Please call IR if patient nor responsive to conservative/endoscopic measures, if clinical situation changes and/or new information becomes available. I reviewed the above and edited where appropriate.  Chart and relevant imaging reviewed.    88M with multiple comorbidities as above, who p/w GIB, found to have high density material in descending colon on a routine CT A/P, concerning for active bleeding.    At the time of consult, patient remains hemodynamically stable with normal BP and HR. Patient also has not received any blood products.     First line therapy in a hemodynamically stable patient with active bleeding is medical resuscitation and rapid prep for colonoscopy.   Recommend continued aggressive resuscitation, type/cross RBCs, serial CBCs and admission to ICU if needed.   Transfuse PRBCs if needed (recommend 2:1:1 with platelets and factors).    Recommend holding Xarelto and reversing coagulopathy (current INR is 1.82).    Angiography/embolization deferred at this time, as most LGIBs will respond to appropriate medical/endoscopic therapy.     Please call IR if patient not responsive to conservative/endoscopic measures, if clinical situation changes and/or new information becomes available.

## 2024-07-31 NOTE — CONSULT NOTE ADULT - ASSESSMENT
1. Diarrhea  2. Rectal bleeding  3. R/o Colitis  4. R/o Diverticular bleeding  5. Anemia with drop in H/H    Suggestions:    1. Monitor H/H  2. Transfuse PRBC as needed  3. Check stool for culture  4. NPO  5. IVF hydration  6. CT-angio  7. Protonix 40mg daily  8. Avoid NSAID  9. Monitor electrolytes  10. DVT prophylaxis

## 2024-07-31 NOTE — PROGRESS NOTE ADULT - PROBLEM SELECTOR PLAN 8
DVT ppx: SCDs iso GIB  GI ppx: PPI IV BID DVT ppx: SCDs iso GIB  GI ppx: PPI IV BID    Tentative colonoscopy in AM of 8/1

## 2024-07-31 NOTE — CONSULT NOTE ADULT - ASSESSMENT
This is an 89 yo male that has been admitted with a GI bleed, at this time the patient remains hemodynamically stable, and has not yet been transfused.     This is an 87 yo male that has been admitted with a GI bleed, at this time the patient remains hemodynamically stable, and has not yet been transfused.

## 2024-08-01 ENCOUNTER — TRANSCRIPTION ENCOUNTER (OUTPATIENT)
Age: 89
End: 2024-08-01

## 2024-08-01 LAB
A1C WITH ESTIMATED AVERAGE GLUCOSE RESULT: 6.6 % — HIGH (ref 4–5.6)
ALBUMIN SERPL ELPH-MCNC: 3.3 G/DL — LOW (ref 3.5–5)
ALP SERPL-CCNC: 75 U/L — SIGNIFICANT CHANGE UP (ref 40–120)
ALT FLD-CCNC: 28 U/L DA — SIGNIFICANT CHANGE UP (ref 10–60)
ANION GAP SERPL CALC-SCNC: 6 MMOL/L — SIGNIFICANT CHANGE UP (ref 5–17)
AST SERPL-CCNC: 34 U/L — SIGNIFICANT CHANGE UP (ref 10–40)
BILIRUB SERPL-MCNC: 1.4 MG/DL — HIGH (ref 0.2–1.2)
BUN SERPL-MCNC: 14 MG/DL — SIGNIFICANT CHANGE UP (ref 7–18)
CALCIUM SERPL-MCNC: 9.2 MG/DL — SIGNIFICANT CHANGE UP (ref 8.4–10.5)
CHLORIDE SERPL-SCNC: 106 MMOL/L — SIGNIFICANT CHANGE UP (ref 96–108)
CO2 SERPL-SCNC: 31 MMOL/L — SIGNIFICANT CHANGE UP (ref 22–31)
CREAT SERPL-MCNC: 1.15 MG/DL — SIGNIFICANT CHANGE UP (ref 0.5–1.3)
EGFR: 62 ML/MIN/1.73M2 — SIGNIFICANT CHANGE UP
ESTIMATED AVERAGE GLUCOSE: 143 MG/DL — HIGH (ref 68–114)
GLUCOSE BLDC GLUCOMTR-MCNC: 113 MG/DL — HIGH (ref 70–99)
GLUCOSE BLDC GLUCOMTR-MCNC: 122 MG/DL — HIGH (ref 70–99)
GLUCOSE BLDC GLUCOMTR-MCNC: 141 MG/DL — HIGH (ref 70–99)
GLUCOSE BLDC GLUCOMTR-MCNC: 172 MG/DL — HIGH (ref 70–99)
GLUCOSE SERPL-MCNC: 115 MG/DL — HIGH (ref 70–99)
HCT VFR BLD CALC: 28.4 % — LOW (ref 39–50)
HGB BLD-MCNC: 9.1 G/DL — LOW (ref 13–17)
MAGNESIUM SERPL-MCNC: 1.8 MG/DL — SIGNIFICANT CHANGE UP (ref 1.6–2.6)
MCHC RBC-ENTMCNC: 26.5 PG — LOW (ref 27–34)
MCHC RBC-ENTMCNC: 32 GM/DL — SIGNIFICANT CHANGE UP (ref 32–36)
MCV RBC AUTO: 82.6 FL — SIGNIFICANT CHANGE UP (ref 80–100)
NRBC # BLD: 0 /100 WBCS — SIGNIFICANT CHANGE UP (ref 0–0)
PHOSPHATE SERPL-MCNC: 2.9 MG/DL — SIGNIFICANT CHANGE UP (ref 2.5–4.5)
PLATELET # BLD AUTO: 145 K/UL — LOW (ref 150–400)
POTASSIUM SERPL-MCNC: 3.8 MMOL/L — SIGNIFICANT CHANGE UP (ref 3.5–5.3)
POTASSIUM SERPL-SCNC: 3.8 MMOL/L — SIGNIFICANT CHANGE UP (ref 3.5–5.3)
PROT SERPL-MCNC: 6 G/DL — SIGNIFICANT CHANGE UP (ref 6–8.3)
RBC # BLD: 3.44 M/UL — LOW (ref 4.2–5.8)
RBC # FLD: 17.3 % — HIGH (ref 10.3–14.5)
SODIUM SERPL-SCNC: 143 MMOL/L — SIGNIFICANT CHANGE UP (ref 135–145)
WBC # BLD: 7.48 K/UL — SIGNIFICANT CHANGE UP (ref 3.8–10.5)
WBC # FLD AUTO: 7.48 K/UL — SIGNIFICANT CHANGE UP (ref 3.8–10.5)

## 2024-08-01 PROCEDURE — 45382 COLONOSCOPY W/CONTROL BLEED: CPT

## 2024-08-01 DEVICE — CLIP RESOLUTION 360 235CM: Type: IMPLANTABLE DEVICE | Status: FUNCTIONAL

## 2024-08-01 RX ADMIN — PANTOPRAZOLE SODIUM 40 MILLIGRAM(S): 20 TABLET, DELAYED RELEASE ORAL at 18:51

## 2024-08-01 RX ADMIN — Medication 0.4 MILLIGRAM(S): at 21:59

## 2024-08-01 RX ADMIN — PANTOPRAZOLE SODIUM 40 MILLIGRAM(S): 20 TABLET, DELAYED RELEASE ORAL at 05:12

## 2024-08-01 RX ADMIN — ATORVASTATIN CALCIUM 40 MILLIGRAM(S): 40 TABLET, FILM COATED ORAL at 21:59

## 2024-08-01 NOTE — PROGRESS NOTE ADULT - PROBLEM SELECTOR PLAN 5
hx of CVA on Xarelto 15 mg daily at home  holding Xarelto iso GI bleed
hx of CVA on Xarelto 15 mg daily at home  holding Xarelto iso GI bleed

## 2024-08-01 NOTE — PROGRESS NOTE ADULT - ASSESSMENT
1. Diarrhea  2. Rectal bleeding stopped  3. R/o Colitis  4. R/o Diverticular bleeding  5. Anemia with drop in H/H    Suggestions:    1. Monitor H/H  2. Transfuse PRBC as needed  3. Check stool for culture  4. NPO  5. IVF hydration  6. Colonoscopy today  7. Protonix 40mg daily  8. Avoid NSAID  9. Monitor electrolytes  10. DVT prophylaxis

## 2024-08-01 NOTE — PROGRESS NOTE ADULT - PROBLEM SELECTOR PLAN 1
p/w 6-8 episodes of bloody BMs over 24 hours, no prev hx of GI bleed  CTAP: High density material layering within the descending colon, concerning for active bleeding. colonic diverticulosis.  per GI discussion with ED, bleeding likely iso diverticular bleeding  Hgb 12.5 > 12.2 > 11 > 9.7  V/S wnl, Hemodynamically stable, however given drop in hgb  IR consulted > IR deferred consult back to GI as no acute intervention is warranted of them   CT angio no evidence of acute GIB  PPI IV BID  Scheduled for colonoscopy today 8/1  Monitor H&H   Maintain active T&S  GI consulted Dr. Mercedes

## 2024-08-01 NOTE — DISCHARGE NOTE PROVIDER - NSDCCPCAREPLAN_GEN_ALL_CORE_FT
PRINCIPAL DISCHARGE DIAGNOSIS  Diagnosis: GI bleed  Assessment and Plan of Treatment: You presented with bright red blood per rectum. Your CTA did not show any active bleeding. You were evaluated by gastroenterologist. You had colonoscopy and it showed --------------  Your blood count level has remain stable and you did not require any blood transfusions. Continue protonix at home  Follow up with gastroenterologist outpatient.  Fllow up with Gastroenterologist for Pathology results.  Avoid NSAIDs unless your Health Care Provider tells you that it is ok (Aspirin, Ibuprofen, Advil, Motrin, Aleve).        SECONDARY DISCHARGE DIAGNOSES  Diagnosis: Gout  Assessment and Plan of Treatment: Continue home medication colhicine. Follow up with primary care provider  Call your physician if you develop pain not relieved with pain regimen, fever and or swelling/redness in your extremity (ies).      Diagnosis: Hyperlipidemia  Assessment and Plan of Treatment: Continue anticholesterol medication. Follow up with primary care provider for long term management    Diagnosis: CVA (cerebrovascular accident)  Assessment and Plan of Treatment: You were taking Xarelto at home for CVA. Your Xarelto was held during your hospital stay due to concern of bleeding. It is now safe to resume ??????????????   Continue taking anticholesterol medication at home  Follow up with primary care provider    Diagnosis: History of prediabetes  Assessment and Plan of Treatment: HgA1C this admission -------------------  Make sure you get your HgA1c checked every three months.  If you have not seen your ophthalmologist this year call for appointment.  Check your feet daily for redness, sores, or openings. Do not self treat. If no improvement in two days call your primary care physician for an appointment.  Follow up with endocrinologist to establish long term goals for your A1c and for long term management and monitoring      Diagnosis: History of BPH  Assessment and Plan of Treatment: Continue tamsulosin at home. Follow up with primary care provider  Call your doctor if you are urinating more frequently, have trouble starting to urinate, have weak stream, urine leaking or dribbling, and feeling as though bladder is not empty after urination  You can help yourself by reducing the amount of fluid you drink before going to bed, limiting the amount of alcohol & caffeine you drink   Avoid cold & allergy medication that contain decongestants or antihistamines which make BPH symptoms worse    Diagnosis: Hypertension  Assessment and Plan of Treatment: Your antihypertensive medications were held during this admission due to GI bleed. Your blood pressure has remain stable. Follow up with primary care provider to establish long term goals for your blood pressure and for long term management and monitoring     PRINCIPAL DISCHARGE DIAGNOSIS  Diagnosis: GI bleed  Assessment and Plan of Treatment: You presented with bright red blood per rectum. Your CTA did not show any active bleeding. You were evaluated by gastroenterologist. You had colonoscopy and it showed diverticula bleed with clips placed.  Your blood count level has remain stable and you did not require any blood transfusions. Continue protonix at home  Follow up with gastroenterologist outpatient.  Fllow up with Gastroenterologist for Pathology results.  Avoid NSAIDs unless your Health Care Provider tells you that it is ok (Aspirin, Ibuprofen, Advil, Motrin, Aleve).        SECONDARY DISCHARGE DIAGNOSES  Diagnosis: Hypertension  Assessment and Plan of Treatment: Your antihypertensive medications were held during this admission due to GI bleed. Your blood pressure has remain stable. Follow up with primary care provider to establish long term goals for your blood pressure and for long term management and monitoring    Diagnosis: Gout  Assessment and Plan of Treatment: Continue home medication colhicine. Follow up with primary care provider  Call your physician if you develop pain not relieved with pain regimen, fever and or swelling/redness in your extremity (ies).      Diagnosis: Hyperlipidemia  Assessment and Plan of Treatment: Continue anticholesterol medication. Follow up with primary care provider for long term management    Diagnosis: CVA (cerebrovascular accident)  Assessment and Plan of Treatment: You were taking Xarelto at home for CVA. Your Xarelto was held during your hospital stay due to concern of bleeding.   It is now safe to resume ??????????????   Continue taking anticholesterol medication at home  Follow up with primary care provider    Diagnosis: History of prediabetes  Assessment and Plan of Treatment: HgA1C this admission 6.6  Make sure you get your HgA1c checked every three months.  If you have not seen your ophthalmologist this year call for appointment.  Check your feet daily for redness, sores, or openings. Do not self treat. If no improvement in two days call your primary care physician for an appointment.  Follow up with endocrinologist to establish long term goals for your A1c and for long term management and monitoring      Diagnosis: History of BPH  Assessment and Plan of Treatment: Continue tamsulosin at home. Follow up with primary care provider  Call your doctor if you are urinating more frequently, have trouble starting to urinate, have weak stream, urine leaking or dribbling, and feeling as though bladder is not empty after urination  You can help yourself by reducing the amount of fluid you drink before going to bed, limiting the amount of alcohol & caffeine you drink   Avoid cold & allergy medication that contain decongestants or antihistamines which make BPH symptoms worse     PRINCIPAL DISCHARGE DIAGNOSIS  Diagnosis: GI bleed  Assessment and Plan of Treatment: You presented with bright red blood per rectum. Your CTA did not show any active bleeding. You were evaluated by gastroenterologist. You had colonoscopy and it showed diverticula bleed with clips placed.  Your blood count level has remain stable and you did not require any blood transfusions. Continue protonix at home  Follow up with gastroenterologist outpatient.  Fllow up with Gastroenterologist for Pathology results.  Avoid NSAIDs unless your Health Care Provider tells you that it is ok (Aspirin, Ibuprofen, Advil, Motrin, Aleve).        SECONDARY DISCHARGE DIAGNOSES  Diagnosis: Hypertension  Assessment and Plan of Treatment: Your antihypertensive medications were held during this admission due to GI bleed. Your blood pressure has remain stable. Follow up with primary care provider to establish long term goals for your blood pressure and for long term management and monitoring    Diagnosis: Gout  Assessment and Plan of Treatment: Continue home medication colhicine. Follow up with primary care provider  Call your physician if you develop pain not relieved with pain regimen, fever and or swelling/redness in your extremity (ies).      Diagnosis: Hyperlipidemia  Assessment and Plan of Treatment: Continue anticholesterol medication. Follow up with primary care provider for long term management    Diagnosis: CVA (cerebrovascular accident)  Assessment and Plan of Treatment: You were taking Xarelto at home for CVA. Your Xarelto was held during your hospital stay due to concern of bleeding.   It is now safe to resume Xarelto per GI Dr. Mercedes  Please follow up with GI Dr. Mercedes in out/pt clinic after discharge.  Continue taking anticholesterol medication at home  Follow up with primary care provider    Diagnosis: History of prediabetes  Assessment and Plan of Treatment: HgA1C this admission 6.6  Make sure you get your HgA1c checked every three months.  If you have not seen your ophthalmologist this year call for appointment.  Check your feet daily for redness, sores, or openings. Do not self treat. If no improvement in two days call your primary care physician for an appointment.  Follow up with endocrinologist to establish long term goals for your A1c and for long term management and monitoring      Diagnosis: History of BPH  Assessment and Plan of Treatment: Continue tamsulosin at home. Follow up with primary care provider  Call your doctor if you are urinating more frequently, have trouble starting to urinate, have weak stream, urine leaking or dribbling, and feeling as though bladder is not empty after urination  You can help yourself by reducing the amount of fluid you drink before going to bed, limiting the amount of alcohol & caffeine you drink   Avoid cold & allergy medication that contain decongestants or antihistamines which make BPH symptoms worse

## 2024-08-01 NOTE — PROGRESS NOTE ADULT - ASSESSMENT
Pt is an 88-year-old male AAOx3, walks independently, Icelandic-speaking. w/ PMHx of pre-diabetes, hypertension, hyperlipidemia, CVA on Xarelto, and gout who p/w multiple dark red  bloody, liquidly bowel movements over the last 24 hours.   In ED, pt reports 6-8 additional, similar BMs. Pt underwent colonoscopy once after he turned 52 and does not remember any abnormal findings at that time.  Hgb 12.5 > 12.2 in ED. Admitted for GI Bleed, likely diverticular bleed.  CT A/P shows High density material layering within the descending colon, concerning for active bleeding. Colonic diverticulosis. GI consulted. Plan for colonoscopy today  8/1

## 2024-08-01 NOTE — DISCHARGE NOTE PROVIDER - NSDCMRMEDTOKEN_GEN_ALL_CORE_FT
atorvastatin 20 mg oral tablet: 1 tab(s) orally once a day (at bedtime)  colchicine 0.6 mg oral tablet: 1 tab(s) orally once a day as needed for  gout pain  metoprolol tartrate 50 mg oral tablet: 1 tab(s) orally once a day  pantoprazole 40 mg oral delayed release tablet: 1 tab(s) orally once a day  tamsulosin 0.4 mg oral capsule: 1 cap(s) orally once a day (at bedtime)  valsartan-hydrochlorothiazide 160 mg-12.5 mg oral tablet: 1 tab(s) orally once a day  Xarelto 15 mg oral tablet: 1 tab(s) orally once a day

## 2024-08-01 NOTE — PROGRESS NOTE ADULT - PROBLEM SELECTOR PLAN 3
hx of gout with most recent flare up 2 weeks ago in R wrist  no swelling or erythema noted on exam today  c/w home med Colchicine 0.6 mg daily
hx of gout with most recent flare up 2 weeks ago in R wrist  no swelling or erythema noted on exam today  c/w home med Colchicine 0.6 mg daily

## 2024-08-01 NOTE — DISCHARGE NOTE PROVIDER - HOSPITAL COURSE
88-year-old male AAOx3, walks independently, Kyrgyz-speaking. w/ PMHx of pre-diabetes, hypertension, hyperlipidemia, CVA on Xarelto, and gout who p/w multiple dark red  bloody, liquidly bowel movements over the last 24 hours.  In ED, pt reports 6-8 additional, similar BMs. Pt underwent colonoscopy once and does not remember any abnormal findings at that time.  Hgb 12.5 > 12.2 in ED. Admitted for GI Bleed, likely diverticular bleed.  CT A/P shows High density material layering within the descending colon, concerning for active bleeding. Colonic diverticulosis. GI consulted. CTA A/P no evidence of active GI bleeding. Plan for colonoscopy today  8/1 88-year-old male AAOx3, walks independently, Setsw[place]-speaking. w/ PMHx of pre-diabetes, hypertension, hyperlipidemia, CVA on Xarelto, and gout who p/w multiple dark red  bloody, liquidly bowel movements over the last 24 hours.  In ED, pt reports 6-8 additional, similar BMs. Pt underwent colonoscopy once and does not remember any abnormal findings at that time.  Hgb 12.5 > 12.2 in ED. Admitted for GI Bleed, likely diverticular bleed.  CT A/P shows High density material layering within the descending colon, concerning for active bleeding. Colonic diverticulosis. GI consulted. CTA A/P no evidence of active GI bleeding. Plan for colonoscopy 8/1 ----------------------------    IR consulted initially for CT A/P results of concerning for active bleeding, Angiography/embolization deferred at this time, due to pt being hemodynamically stable.    Pt is medically optimized for discharge, discussed with the attending  This is just a brief hospital course, please refer to the progress notes for detailed information   88-year-old male AAOx3, walks independently, Sinhala-speaking. w/ PMHx of pre-diabetes, hypertension, hyperlipidemia, CVA on Xarelto, and gout who p/w multiple dark red  bloody, liquidly bowel movements over the last 24 hours. Patient is on Xarelto for hx CVA. Xarelto held iso GIB  In ED, pt reports 6-8 additional, similar BMs. Pt underwent colonoscopy once and does not remember any abnormal findings at that time.  Hgb 12.5 > 12.2 in ED. Admitted for GI Bleed, likely diverticular bleed.  CT A/P shows High density material layering within the descending colon, concerning for active bleeding. Colonic diverticulosis. GI consulted. CTA A/P no evidence of active GI bleeding.     IR consulted initially for CT A/P results of concerning for active bleeding, Angiography/embolization deferred at this time, due to pt being hemodynamically stable.  S/P colonoscopy 8/1 showing diverticular bleed with clips placed. Patient tolerated regular diet with no more abdominal symptoms.   Pt denies anymore Rectal bleed.    Pt is medically optimized for discharge, discussed with the attending  This is just a brief hospital course, please refer to the progress notes for detailed information   88-year-old male AAOx3, walks independently, Serbian-speaking. w/ PMHx of pre-diabetes, hypertension, hyperlipidemia, CVA on Xarelto, and gout who p/w multiple dark red  bloody, liquidly bowel movements over the last 24 hours. Patient is on Xarelto for hx CVA. Xarelto held iso GIB  In ED, pt reports 6-8 additional, similar BMs. Pt underwent colonoscopy once and does not remember any abnormal findings at that time.  Hgb 12.5 > 12.2 in ED. Admitted for GI Bleed, likely diverticular bleed.  CT A/P shows High density material layering within the descending colon, concerning for active bleeding. Colonic diverticulosis. GI consulted. CTA A/P no evidence of active GI bleeding.     IR consulted initially for CT A/P results of concerning for active bleeding, Angiography/embolization deferred at this time, due to pt being hemodynamically stable.  S/P colonoscopy 8/1 showing diverticular bleed with clips placed. Patient tolerated regular diet with no more abdominal symptoms.   Pt denies anymore Rectal bleed.    Pt is medically optimized for discharge, discussed with the attending & GI Dr. Mercedes. Per Dr. Mercedes, it is ok to restart Xarelto upon discharge, & follow up with GI in out/patient.   This is just a brief hospital course, please refer to the progress notes for detailed information

## 2024-08-01 NOTE — PROGRESS NOTE ADULT - PROBLEM SELECTOR PLAN 6
hx of pre DM ( on Metformin?)  f/u A1c  ZOHRA and FC ACHS
hx of pre DM ( on Metformin?)  f/u A1c  ZOHRA and FC ACHS

## 2024-08-01 NOTE — DISCHARGE NOTE PROVIDER - CARE PROVIDER_API CALL
Chris Mercedes  Gastroenterology  [address] [place] [address], [address]  [place] [address]  Phone: [phone]  Fax: [phone]  Follow Up Time: 2 weeks

## 2024-08-01 NOTE — PROGRESS NOTE ADULT - PROBLEM SELECTOR PLAN 2
hx of HTN on Valsartan-HCTZ and Lopressor  holding home BP meds iso bradycardia and GI bleed  monitor BP
hx of HTN on Valsartan-HCTZ and Lopressor  holding home BP meds iso bradycardia and GI bleed  monitor BP

## 2024-08-02 ENCOUNTER — TRANSCRIPTION ENCOUNTER (OUTPATIENT)
Age: 89
End: 2024-08-02

## 2024-08-02 VITALS
HEART RATE: 65 BPM | OXYGEN SATURATION: 98 % | TEMPERATURE: 99 F | RESPIRATION RATE: 17 BRPM | DIASTOLIC BLOOD PRESSURE: 64 MMHG | SYSTOLIC BLOOD PRESSURE: 134 MMHG

## 2024-08-02 LAB
ANION GAP SERPL CALC-SCNC: 4 MMOL/L — LOW (ref 5–17)
BUN SERPL-MCNC: 10 MG/DL — SIGNIFICANT CHANGE UP (ref 7–18)
CALCIUM SERPL-MCNC: 8.7 MG/DL — SIGNIFICANT CHANGE UP (ref 8.4–10.5)
CHLORIDE SERPL-SCNC: 106 MMOL/L — SIGNIFICANT CHANGE UP (ref 96–108)
CO2 SERPL-SCNC: 31 MMOL/L — SIGNIFICANT CHANGE UP (ref 22–31)
CREAT SERPL-MCNC: 1.15 MG/DL — SIGNIFICANT CHANGE UP (ref 0.5–1.3)
EGFR: 62 ML/MIN/1.73M2 — SIGNIFICANT CHANGE UP
GLUCOSE BLDC GLUCOMTR-MCNC: 104 MG/DL — HIGH (ref 70–99)
GLUCOSE BLDC GLUCOMTR-MCNC: 107 MG/DL — HIGH (ref 70–99)
GLUCOSE SERPL-MCNC: 107 MG/DL — HIGH (ref 70–99)
HCT VFR BLD CALC: 26.6 % — LOW (ref 39–50)
HGB BLD-MCNC: 8.5 G/DL — LOW (ref 13–17)
MCHC RBC-ENTMCNC: 26.3 PG — LOW (ref 27–34)
MCHC RBC-ENTMCNC: 32 GM/DL — SIGNIFICANT CHANGE UP (ref 32–36)
MCV RBC AUTO: 82.4 FL — SIGNIFICANT CHANGE UP (ref 80–100)
NRBC # BLD: 0 /100 WBCS — SIGNIFICANT CHANGE UP (ref 0–0)
PLATELET # BLD AUTO: 137 K/UL — LOW (ref 150–400)
POTASSIUM SERPL-MCNC: 3.5 MMOL/L — SIGNIFICANT CHANGE UP (ref 3.5–5.3)
POTASSIUM SERPL-SCNC: 3.5 MMOL/L — SIGNIFICANT CHANGE UP (ref 3.5–5.3)
RBC # BLD: 3.23 M/UL — LOW (ref 4.2–5.8)
RBC # FLD: 17.5 % — HIGH (ref 10.3–14.5)
SODIUM SERPL-SCNC: 141 MMOL/L — SIGNIFICANT CHANGE UP (ref 135–145)
WBC # BLD: 7.38 K/UL — SIGNIFICANT CHANGE UP (ref 3.8–10.5)
WBC # FLD AUTO: 7.38 K/UL — SIGNIFICANT CHANGE UP (ref 3.8–10.5)

## 2024-08-02 PROCEDURE — 93005 ELECTROCARDIOGRAM TRACING: CPT

## 2024-08-02 PROCEDURE — 85730 THROMBOPLASTIN TIME PARTIAL: CPT

## 2024-08-02 PROCEDURE — 80053 COMPREHEN METABOLIC PANEL: CPT

## 2024-08-02 PROCEDURE — 85025 COMPLETE CBC W/AUTO DIFF WBC: CPT

## 2024-08-02 PROCEDURE — 84100 ASSAY OF PHOSPHORUS: CPT

## 2024-08-02 PROCEDURE — 83690 ASSAY OF LIPASE: CPT

## 2024-08-02 PROCEDURE — 81001 URINALYSIS AUTO W/SCOPE: CPT

## 2024-08-02 PROCEDURE — 85027 COMPLETE CBC AUTOMATED: CPT

## 2024-08-02 PROCEDURE — C1889: CPT

## 2024-08-02 PROCEDURE — 86900 BLOOD TYPING SEROLOGIC ABO: CPT

## 2024-08-02 PROCEDURE — 74174 CTA ABD&PLVS W/CONTRAST: CPT | Mod: MC

## 2024-08-02 PROCEDURE — 86901 BLOOD TYPING SEROLOGIC RH(D): CPT

## 2024-08-02 PROCEDURE — 85610 PROTHROMBIN TIME: CPT

## 2024-08-02 PROCEDURE — 83735 ASSAY OF MAGNESIUM: CPT

## 2024-08-02 PROCEDURE — 86850 RBC ANTIBODY SCREEN: CPT

## 2024-08-02 PROCEDURE — 82962 GLUCOSE BLOOD TEST: CPT

## 2024-08-02 PROCEDURE — 80048 BASIC METABOLIC PNL TOTAL CA: CPT

## 2024-08-02 PROCEDURE — 83036 HEMOGLOBIN GLYCOSYLATED A1C: CPT

## 2024-08-02 PROCEDURE — 74177 CT ABD & PELVIS W/CONTRAST: CPT | Mod: MC

## 2024-08-02 PROCEDURE — 36415 COLL VENOUS BLD VENIPUNCTURE: CPT

## 2024-08-02 PROCEDURE — 99285 EMERGENCY DEPT VISIT HI MDM: CPT | Mod: 25

## 2024-08-02 RX ADMIN — COLCHICINE 0.6 MILLIGRAM(S): 0.6 TABLET, FILM COATED ORAL at 11:20

## 2024-08-02 RX ADMIN — PANTOPRAZOLE SODIUM 40 MILLIGRAM(S): 20 TABLET, DELAYED RELEASE ORAL at 05:17

## 2024-08-02 NOTE — PROGRESS NOTE ADULT - SUBJECTIVE AND OBJECTIVE BOX
INTERVAL HPI/OVERNIGHT EVENTS:  Patient seen,no acute bleeding,stable  VITAL SIGNS:  T(F): 98.1 (08-02-24 @ 05:20)  HR: 81 (08-02-24 @ 05:20)  BP: 146/62 (08-02-24 @ 05:20)  RR: 17 (08-02-24 @ 05:20)  SpO2: 97% (08-02-24 @ 05:20)  Wt(kg): --    PHYSICAL EXAM:  awake,comfortable  Constitutional:  Eyes:  ENMT:perrla  Neck:  Respiratory:clear  Cardiovascular:ss2,m-none  Gastrointestinal:soft,bs pos  Extremities:  Vascular:  Neurological:no focal deficit  Musculoskeletal:    MEDICATIONS  (STANDING):  atorvastatin 40 milliGRAM(s) Oral at bedtime  colchicine 0.6 milliGRAM(s) Oral daily  insulin lispro (ADMELOG) corrective regimen sliding scale   SubCutaneous three times a day before meals  insulin lispro (ADMELOG) corrective regimen sliding scale   SubCutaneous at bedtime  pantoprazole  Injectable 40 milliGRAM(s) IV Push two times a day  tamsulosin 0.4 milliGRAM(s) Oral at bedtime    MEDICATIONS  (PRN):  acetaminophen     Tablet .. 650 milliGRAM(s) Oral every 6 hours PRN Temp greater or equal to 38C (100.4F), Mild Pain (1 - 3)  aluminum hydroxide/magnesium hydroxide/simethicone Suspension 30 milliLiter(s) Oral every 4 hours PRN Dyspepsia  melatonin 3 milliGRAM(s) Oral at bedtime PRN Insomnia  ondansetron Injectable 4 milliGRAM(s) IV Push every 8 hours PRN Nausea and/or Vomiting      Allergies    No Known Allergies    Intolerances        LABS:                        8.5    7.38  )-----------( 137      ( 02 Aug 2024 05:26 )             26.6     08-02    141  |  106  |  10  ----------------------------<  107<H>  3.5   |  31  |  1.15    Ca    8.7      02 Aug 2024 05:26  Phos  2.9     08-01  Mg     1.8     08-01    TPro  6.0  /  Alb  3.3<L>  /  TBili  1.4<H>  /  DBili  x   /  AST  34  /  ALT  28  /  AlkPhos  75  08-01      Urinalysis Basic - ( 02 Aug 2024 05:26 )    Color: x / Appearance: x / SG: x / pH: x  Gluc: 107 mg/dL / Ketone: x  / Bili: x / Urobili: x   Blood: x / Protein: x / Nitrite: x   Leuk Esterase: x / RBC: x / WBC x   Sq Epi: x / Non Sq Epi: x / Bacteria: x        RADIOLOGY & ADDITIONAL TESTS:        Assessment and Plan:   · Assessment	  Pt is an 88-year-old male AAOx3, walks independently, Nauruan-speaking. w/ PMHx of pre-diabetes, hypertension, hyperlipidemia, CVA on Xarelto, and gout who p/w multiple dark red  bloody, liquidly bowel movements over the last 24 hours.   In ED, pt reports 6-8 additional, similar BMs. Pt underwent colonoscopy once after he turned 52 and does not remember any abnormal findings at that time.  Hgb 12.5 > 12.2 in ED. Admitted for GI Bleed, likely diverticular bleed.  CT A/P shows High density material layering within the descending colon, concerning for active bleeding. Colonic diverticulosis. GI consulted. Plan for colonoscopy today  8/1             Problem/Plan - 1:  ·  Problem: GI bleed-stable clinically  ·PPI IV BID  s/p colonoscopy   Monitor H&H   Maintain active T&S  GI consulted Dr. Mercedes.     Problem/Plan - 2:  ·  Problem: Hypertension.   ·  Plan: hx of HTN on Valsartan-HCTZ and Lopressor  holding home BP meds iso bradycardia and GI bleed  monitor BP.     Problem/Plan - 3:  ·  Problem: Gout.   ·  Plan: hx of gout with most recent flare up 2 weeks ago in R wrist  no swelling or erythema noted on exam today  c/w home med Colchicine 0.6 mg daily.     Problem/Plan - 4:  ·  Problem: Hyperlipidemia.   ·  Plan: hx of HLD on Atorvastatin at home  c/w home med.     Problem/Plan - 5:  ·  Problem: CVA (cerebrovascular accident).   ·  Plan: hx of CVA on Xarelto 15 mg daily at home  holding Xarelto iso GI bleed.     Problem/Plan - 6:  ·  Problem: History of prediabetes.   ·  Plan: hx of pre DM ( on Metformin?)  f/u A1c  ZOHRA and FC ACHS.     Problem/Plan - 7:  ·  Problem: History of BPH.   ·  Plan: c/w home med Tamsulosin 0.4 mg qd.     Problem/Plan - 8:  ·  Problem: Prophylactic measure.   ·  Plan: DVT ppx: SCDs iso GIB  GI ppx: PPI IV BID    For colonoscopy today.     Problem/Plan - 9:  ·  Problem: Discharge planning issues.   ·  Plan: Pt is from home  Pending GI final recs after colonoscopy.    
NP Note discussed with  primary attending    Patient is a 88y old  Male who presents with a chief complaint of     INTERVAL HPI/OVERNIGHT EVENTS: no new complaints    MEDICATIONS  (STANDING):  atorvastatin 40 milliGRAM(s) Oral at bedtime  colchicine 0.6 milliGRAM(s) Oral daily  insulin lispro (ADMELOG) corrective regimen sliding scale   SubCutaneous three times a day before meals  insulin lispro (ADMELOG) corrective regimen sliding scale   SubCutaneous at bedtime  pantoprazole  Injectable 40 milliGRAM(s) IV Push two times a day  tamsulosin 0.4 milliGRAM(s) Oral at bedtime    MEDICATIONS  (PRN):  acetaminophen     Tablet .. 650 milliGRAM(s) Oral every 6 hours PRN Temp greater or equal to 38C (100.4F), Mild Pain (1 - 3)  aluminum hydroxide/magnesium hydroxide/simethicone Suspension 30 milliLiter(s) Oral every 4 hours PRN Dyspepsia  melatonin 3 milliGRAM(s) Oral at bedtime PRN Insomnia  ondansetron Injectable 4 milliGRAM(s) IV Push every 8 hours PRN Nausea and/or Vomiting      __________________________________________________  REVIEW OF SYSTEMS:    CONSTITUTIONAL: No fever,   EYES: no acute visual disturbances  NECK: No pain or stiffness  RESPIRATORY: No cough; No shortness of breath  CARDIOVASCULAR: No chest pain, no palpitations  GASTROINTESTINAL: No pain. No nausea or vomiting; No diarrhea   NEUROLOGICAL: No headache or numbness, no tremors  MUSCULOSKELETAL: No joint pain, no muscle pain  GENITOURINARY: no dysuria, no frequency, no hesitancy  PSYCHIATRY: no depression , no anxiety  ALL OTHER  ROS negative        Vital Signs Last 24 Hrs  T(C): 36.3 (01 Aug 2024 05:22), Max: 36.9 (31 Jul 2024 14:00)  T(F): 97.4 (01 Aug 2024 05:22), Max: 98.5 (31 Jul 2024 14:00)  HR: 62 (01 Aug 2024 05:22) (56 - 67)  BP: 135/52 (01 Aug 2024 05:22) (131/70 - 144/74)  BP(mean): 772 (01 Aug 2024 05:22) (82 - 772)  RR: 18 (01 Aug 2024 05:22) (17 - 18)  SpO2: 95% (01 Aug 2024 05:22) (95% - 99%)    Parameters below as of 01 Aug 2024 05:22  Patient On (Oxygen Delivery Method): room air        ________________________________________________  PHYSICAL EXAM:  GENERAL: NAD  HEENT: Normocephalic;  conjunctivae and sclerae clear; moist mucous membranes;   NECK : supple  CHEST/LUNG: Clear to ausculitation bilaterally with good air entry   HEART: S1 S2  regular; no murmurs, gallops or rubs  ABDOMEN: Soft, Nontender, Nondistended; Bowel sounds present  EXTREMITIES: no cyanosis; no edema; no calf tenderness  SKIN: warm and dry; no rash  NERVOUS SYSTEM:  Awake and alert; Oriented  to place, person and time ; no new deficits    _________________________________________________  LABS:                        9.1    7.48  )-----------( 145      ( 01 Aug 2024 06:20 )             28.4     08-01    143  |  106  |  14  ----------------------------<  115<H>  3.8   |  31  |  1.15    Ca    9.2      01 Aug 2024 06:20  Phos  2.9     08-01  Mg     1.8     08-01    TPro  6.0  /  Alb  3.3<L>  /  TBili  1.4<H>  /  DBili  x   /  AST  34  /  ALT  28  /  AlkPhos  75  08-01    PT/INR - ( 31 Jul 2024 06:10 )   PT: 20.4 sec;   INR: 1.82 ratio         PTT - ( 31 Jul 2024 06:10 )  PTT:32.2 sec  Urinalysis Basic - ( 01 Aug 2024 06:20 )    Color: x / Appearance: x / SG: x / pH: x  Gluc: 115 mg/dL / Ketone: x  / Bili: x / Urobili: x   Blood: x / Protein: x / Nitrite: x   Leuk Esterase: x / RBC: x / WBC x   Sq Epi: x / Non Sq Epi: x / Bacteria: x      CAPILLARY BLOOD GLUCOSE      POCT Blood Glucose.: 113 mg/dL (01 Aug 2024 12:05)  POCT Blood Glucose.: 141 mg/dL (01 Aug 2024 07:52)  POCT Blood Glucose.: 118 mg/dL (31 Jul 2024 21:10)  POCT Blood Glucose.: 115 mg/dL (31 Jul 2024 16:39)        RADIOLOGY & ADDITIONAL TESTS:  < from: CT Angio Abdomen and Pelvis w/ IV Cont (07.31.24 @ 19:13) >    IMPRESSION:  No evidence of active GI bleeding on this examination.        < end of copied text >    Imaging Personally Reviewed:  YES    Consultant(s) Notes Reviewed:   YES    Care Discussed with Consultants :     Plan of care was discussed with patient and /or primary care giver; all questions and concerns were addressed and care was aligned with patient's wishes.    
[   ] ICU                                          [   ] CCU                                      [ X  ] Medical Floor    Patient is a 88 year old male with Rectal bleeding. GI consulted to evaluate.           Pt is an 88-year-old male AAOx3, walks independently, Czech-speaking. with past medical history significant for hypertension, hyperlipidemia, CVA on Xarelto, and gout who presented to the emergency room with one day history of progressively worsening watery diarrhea mixed with blood. Patient denies abdominal pain, nausea, vomiting, hematemesis, melena, fever, chills, chest pain, SOB, cough, hematuria, dysuria, recent traveling or antibiotics intake.       Patient appears comfortable. No new complaints reported, No abdominal pain, N/V, hematemesis, hematochezia, melena, fever, chills, chest pain, SOB, cough or diarrhea reported.       PAIN MANAGEMENT:  Pain Scale:                0 /10  Pain Location:      Prior Colonoscopy: 30 years ago      PAST MEDICAL HISTORY    Gout    Hypertension    Hyperlipidemia    CVA (cerebrovascular accident)        PAST SURGICAL HISTORY    No significant surgical history reported        Allergies    No Known Allergies    Intolerances  None         MEDICATIONS  (STANDING):  atorvastatin 40 milliGRAM(s) Oral at bedtime  colchicine 0.6 milliGRAM(s) Oral daily  insulin lispro (ADMELOG) corrective regimen sliding scale   SubCutaneous three times a day before meals  insulin lispro (ADMELOG) corrective regimen sliding scale   SubCutaneous at bedtime  pantoprazole  Injectable 40 milliGRAM(s) IV Push two times a day  tamsulosin 0.4 milliGRAM(s) Oral at bedtime    MEDICATIONS  (PRN):  acetaminophen     Tablet .. 650 milliGRAM(s) Oral every 6 hours PRN Temp greater or equal to 38C (100.4F), Mild Pain (1 - 3)  aluminum hydroxide/magnesium hydroxide/simethicone Suspension 30 milliLiter(s) Oral every 4 hours PRN Dyspepsia  melatonin 3 milliGRAM(s) Oral at bedtime PRN Insomnia  ondansetron Injectable 4 milliGRAM(s) IV Push every 8 hours PRN Nausea and/or Vomiting      SOCIAL HISTORY  Advanced Directives:       [X  ] Full Code       [  ] DNR  Marital Status:         [  ] M      [ X ] S      [  ] D       [  ] W  Children:       [ X ] Yes      [  ] No  Occupation:        [  ] Employed       [X  ] Unemployed       [  ] Retired  Diet:       [ X ] Regular       [  ] PEG feeding          [  ] NG tube feeding  Drug Use:           [  X] Patient denied          [  ] Yes  Alcohol:           [ X ] No             [  ] Yes (socially)         [  ] Yes (chronic)  Tobacco:           [  ] Yes           [ X ] No      FAMILY HISTORY  [ X ] Heart Disease            [ X ] Diabetes             [ X ] HTN             [  ] Colon Cancer             [  ] Stomach Cancer              [  ] Pancreatic Cancer        VITALS   Vital Signs Last 24 Hrs  T(C): 36.3 (08-01-24 @ 05:22), Max: 36.9 (07-31-24 @ 14:00)  T(F): 97.4 (08-01-24 @ 05:22), Max: 98.5 (07-31-24 @ 14:00)  HR: 62 (08-01-24 @ 05:22) (56 - 67)  BP: 135/52 (08-01-24 @ 05:22) (131/70 - 144/74)  BP(mean): 772 (08-01-24 @ 05:22) (82 - 772)  RR: 18 (08-01-24 @ 05:22) (17 - 18)  SpO2: 95% (08-01-24 @ 05:22) (95% - 99%)      MEDICATIONS  (STANDING):  atorvastatin 40 milliGRAM(s) Oral at bedtime  colchicine 0.6 milliGRAM(s) Oral daily  insulin lispro (ADMELOG) corrective regimen sliding scale   SubCutaneous three times a day before meals  insulin lispro (ADMELOG) corrective regimen sliding scale   SubCutaneous at bedtime  pantoprazole  Injectable 40 milliGRAM(s) IV Push two times a day  tamsulosin 0.4 milliGRAM(s) Oral at bedtime    MEDICATIONS  (PRN):  acetaminophen     Tablet .. 650 milliGRAM(s) Oral every 6 hours PRN Temp greater or equal to 38C (100.4F), Mild Pain (1 - 3)  aluminum hydroxide/magnesium hydroxide/simethicone Suspension 30 milliLiter(s) Oral every 4 hours PRN Dyspepsia  melatonin 3 milliGRAM(s) Oral at bedtime PRN Insomnia  ondansetron Injectable 4 milliGRAM(s) IV Push every 8 hours PRN Nausea and/or Vomiting                            9.1    7.48  )-----------( 145      ( 01 Aug 2024 06:20 )             28.4       08-01    143  |  106  |  14  ----------------------------<  115<H>  3.8   |  31  |  1.15    Ca    9.2      01 Aug 2024 06:20  Phos  2.9     08-01  Mg     1.8     08-01    TPro  6.0  /  Alb  3.3<L>  /  TBili  1.4<H>  /  DBili  x   /  AST  34  /  ALT  28  /  AlkPhos  75  08-01      PT/INR - ( 31 Jul 2024 06:10 )   PT: 20.4 sec;   INR: 1.82 ratio         PTT - ( 31 Jul 2024 06:10 )  PTT:32.2 sec      ACC: [id number] EXAM:  CT ANGIO ABD PELV (W)AW IC   ORDERED BY: HERNANDO DONAHUE     PROCEDURE DATE:  07/31/2024          INTERPRETATION:  CLINICAL INFORMATION: GI bleed.    COMPARISON: CT abdomen pelvis 7/30/2024..    CONTRAST/COMPLICATIONS:  IV Contrast: Omnipaque 350  90 cc administered   10 cc discarded  Oral Contrast: NONE  Complications: None reported at time of study completion    PROCEDURE:  CT of the Abdomen and Pelvis was performed.  Precontrast, Arterial and Delayed phases were performed.  Sagittal and coronal reformats were performed.    FINDINGS:  LOWER CHEST: Elevated left hemidiaphragm. Cardiomegaly.    LIVER: Within normal limits.  BILE DUCTS: Normal caliber.  GALLBLADDER: Vicarious excretion of contrast.  SPLEEN: Within normal limits.  PANCREAS: Within normal limits.  ADRENALS: Within normal limits.  KIDNEYS/URETERS: No hydronephrosis. Excreted contrast in the collecting   systems and ureters. Bilateral renal cysts.    BLADDER: Within normal limits.  REPRODUCTIVE ORGANS: Prostate within normal limits.    BOWEL: No bowel obstruction. Appendix is normal. Scattered colonic   diverticula. No evidence of contrast extravasation.  PERITONEUM/RETROPERITONEUM: No ascites.  VESSELS: Atherosclerotic changes.  LYMPH NODES: No lymphadenopathy.  ABDOMINAL WALL: Within normal limits.  BONES: Degenerative changes.    IMPRESSION:  No evidence of active GI bleeding on this examination.    
NP Note discussed with  primary attending    Patient is a 88y old  Male who presents with a chief complaint of     INTERVAL HPI/OVERNIGHT EVENTS: no new complaints    MEDICATIONS  (STANDING):  atorvastatin 40 milliGRAM(s) Oral at bedtime  colchicine 0.6 milliGRAM(s) Oral daily  insulin lispro (ADMELOG) corrective regimen sliding scale   SubCutaneous three times a day before meals  insulin lispro (ADMELOG) corrective regimen sliding scale   SubCutaneous at bedtime  pantoprazole  Injectable 40 milliGRAM(s) IV Push two times a day  tamsulosin 0.4 milliGRAM(s) Oral at bedtime    MEDICATIONS  (PRN):  acetaminophen     Tablet .. 650 milliGRAM(s) Oral every 6 hours PRN Temp greater or equal to 38C (100.4F), Mild Pain (1 - 3)  aluminum hydroxide/magnesium hydroxide/simethicone Suspension 30 milliLiter(s) Oral every 4 hours PRN Dyspepsia  melatonin 3 milliGRAM(s) Oral at bedtime PRN Insomnia  ondansetron Injectable 4 milliGRAM(s) IV Push every 8 hours PRN Nausea and/or Vomiting      __________________________________________________  REVIEW OF SYSTEMS:    CONSTITUTIONAL: No fever,   EYES: no acute visual disturbances  NECK: No pain or stiffness  RESPIRATORY: No cough; No shortness of breath  CARDIOVASCULAR: No chest pain, no palpitations  GASTROINTESTINAL: No pain. No nausea or vomiting; No diarrhea   NEUROLOGICAL: No headache or numbness, no tremors  MUSCULOSKELETAL: No joint pain, no muscle pain  GENITOURINARY: no dysuria, no frequency, no hesitancy  PSYCHIATRY: no depression , no anxiety  ALL OTHER  ROS negative        Vital Signs Last 24 Hrs  T(C): 36.5 (31 Jul 2024 05:37), Max: 37 (30 Jul 2024 14:29)  T(F): 97.7 (31 Jul 2024 05:37), Max: 98.6 (30 Jul 2024 14:29)  HR: 61 (31 Jul 2024 05:37) (42 - 68)  BP: 126/50 (31 Jul 2024 05:37) (126/50 - 165/75)  BP(mean): 68 (31 Jul 2024 05:37) (68 - 87)  RR: 18 (31 Jul 2024 05:37) (14 - 18)  SpO2: 99% (31 Jul 2024 05:37) (98% - 100%)    Parameters below as of 30 Jul 2024 23:11  Patient On (Oxygen Delivery Method): room air        ________________________________________________  PHYSICAL EXAM:  GENERAL: NAD  HEENT: Normocephalic;  conjunctivae and sclerae clear; moist mucous membranes;   NECK : supple  CHEST/LUNG: Clear to auscultation bilaterally with good air entry   HEART: S1 S2  regular; no murmurs, gallops or rubs  ABDOMEN: Soft, Nontender, Nondistended; Bowel sounds present  EXTREMITIES: no cyanosis; no edema; no calf tenderness  SKIN: warm and dry; no rash  NERVOUS SYSTEM:  Awake and alert; Oriented  to place, person and time ; no new deficits    _________________________________________________  LABS:                        9.7    7.11  )-----------( 141      ( 31 Jul 2024 06:10 )             30.6     07-31    143  |  108  |  21<H>  ----------------------------<  102<H>  3.6   |  29  |  1.11    Ca    8.6      31 Jul 2024 06:10  Phos  3.6     07-31  Mg     1.9     07-31    TPro  5.9<L>  /  Alb  2.9<L>  /  TBili  1.3<H>  /  DBili  x   /  AST  33  /  ALT  28  /  AlkPhos  79  07-31    PT/INR - ( 31 Jul 2024 06:10 )   PT: 20.4 sec;   INR: 1.82 ratio         PTT - ( 31 Jul 2024 06:10 )  PTT:32.2 sec  Urinalysis Basic - ( 31 Jul 2024 06:10 )    Color: x / Appearance: x / SG: x / pH: x  Gluc: 102 mg/dL / Ketone: x  / Bili: x / Urobili: x   Blood: x / Protein: x / Nitrite: x   Leuk Esterase: x / RBC: x / WBC x   Sq Epi: x / Non Sq Epi: x / Bacteria: x      CAPILLARY BLOOD GLUCOSE      POCT Blood Glucose.: 122 mg/dL (30 Jul 2024 14:59)        RADIOLOGY & ADDITIONAL TESTS:  < from: CT Abdomen and Pelvis w/ IV Cont (07.30.24 @ 17:59) >  IMPRESSION:    High density material layering within the descending colon, concerning   for active bleeding.    Colonic diverticulosis.    < end of copied text >    Imaging Personally Reviewed:  YES    Consultant(s) Notes Reviewed:   YES    Care Discussed with Consultants :     Plan of care was discussed with patient and /or primary care giver; all questions and concerns were addressed and care was aligned with patient's wishes.    
 [   ] ICU                                          [   ] CCU                                      [ X  ] Medical Floor    Patient is a 88 year old male with Rectal bleeding. GI consulted to evaluate.           Pt is an 88-year-old male AAOx3, walks independently, Ecuadorean-speaking. with past medical history significant for hypertension, hyperlipidemia, CVA on Xarelto, and gout who presented to the emergency room with one day history of progressively worsening watery diarrhea mixed with blood. Patient denies abdominal pain, nausea, vomiting, hematemesis, melena, fever, chills, chest pain, SOB, cough, hematuria, dysuria, recent traveling or antibiotics intake.       Patient appears comfortable. No new complaints reported, No abdominal pain, N/V, hematemesis, hematochezia, melena, fever, chills, chest pain, SOB, cough or diarrhea reported.       PAIN MANAGEMENT:  Pain Scale:                0 /10  Pain Location:      Prior Colonoscopy: 30 years ago      PAST MEDICAL HISTORY    Gout    Hypertension    Hyperlipidemia    CVA (cerebrovascular accident)        PAST SURGICAL HISTORY    No significant surgical history reported        Allergies    No Known Allergies    Intolerances  None         SOCIAL HISTORY  Advanced Directives:       [X  ] Full Code       [  ] DNR  Marital Status:         [  ] M      [ X ] S      [  ] D       [  ] W  Children:       [ X ] Yes      [  ] No  Occupation:        [  ] Employed       [X  ] Unemployed       [  ] Retired  Diet:       [ X ] Regular       [  ] PEG feeding          [  ] NG tube feeding  Drug Use:           [  X] Patient denied          [  ] Yes  Alcohol:           [ X ] No             [  ] Yes (socially)         [  ] Yes (chronic)  Tobacco:           [  ] Yes           [ X ] No      FAMILY HISTORY  [ X ] Heart Disease            [ X ] Diabetes             [ X ] HTN             [  ] Colon Cancer             [  ] Stomach Cancer              [  ] Pancreatic Cancer        VITALS   Vital Signs Last 24 Hrs  T(C): 36.7 (08-02-24 @ 05:20), Max: 36.9 (08-01-24 @ 21:00)  T(F): 98.1 (08-02-24 @ 05:20), Max: 98.5 (08-01-24 @ 21:00)  HR: 81 (08-02-24 @ 05:20) (64 - 91)  BP: 146/62 (08-02-24 @ 05:20) (84/67 - 160/95)  BP(mean): 90 (08-02-24 @ 05:20) (70 - 90)  RR: 17 (08-02-24 @ 05:20) (17 - 24)  SpO2: 97% (08-02-24 @ 05:20) (93% - 100%)        MEDICATIONS  (STANDING):  atorvastatin 40 milliGRAM(s) Oral at bedtime  colchicine 0.6 milliGRAM(s) Oral daily  insulin lispro (ADMELOG) corrective regimen sliding scale   SubCutaneous three times a day before meals  insulin lispro (ADMELOG) corrective regimen sliding scale   SubCutaneous at bedtime  pantoprazole  Injectable 40 milliGRAM(s) IV Push two times a day  tamsulosin 0.4 milliGRAM(s) Oral at bedtime    MEDICATIONS  (PRN):  acetaminophen     Tablet .. 650 milliGRAM(s) Oral every 6 hours PRN Temp greater or equal to 38C (100.4F), Mild Pain (1 - 3)  aluminum hydroxide/magnesium hydroxide/simethicone Suspension 30 milliLiter(s) Oral every 4 hours PRN Dyspepsia  melatonin 3 milliGRAM(s) Oral at bedtime PRN Insomnia  ondansetron Injectable 4 milliGRAM(s) IV Push every 8 hours PRN Nausea and/or Vomiting                            8.5    7.38  )-----------( 137      ( 02 Aug 2024 05:26 )             26.6       08-02    141  |  106  |  10  ----------------------------<  107<H>  3.5   |  31  |  1.15    Ca    8.7      02 Aug 2024 05:26  Phos  2.9     08-01  Mg     1.8     08-01    TPro  6.0  /  Alb  3.3<L>  /  TBili  1.4<H>  /  DBili  x   /  AST  34  /  ALT  28  /  AlkPhos  75  08-01

## 2024-08-02 NOTE — DIETITIAN INITIAL EVALUATION ADULT - ORAL NUTRITION SUPPLEMENTS
Recommend Ensure Clear (provides 240 kcal, 8 gm protein per 8oz serving) 2 x day to aid in inadequate energy intake >3 days

## 2024-08-02 NOTE — DIETITIAN INITIAL EVALUATION ADULT - PERTINENT MEDS FT
MEDICATIONS  (STANDING):  atorvastatin 40 milliGRAM(s) Oral at bedtime  colchicine 0.6 milliGRAM(s) Oral daily  insulin lispro (ADMELOG) corrective regimen sliding scale   SubCutaneous three times a day before meals  insulin lispro (ADMELOG) corrective regimen sliding scale   SubCutaneous at bedtime  pantoprazole  Injectable 40 milliGRAM(s) IV Push two times a day  tamsulosin 0.4 milliGRAM(s) Oral at bedtime    MEDICATIONS  (PRN):  acetaminophen     Tablet .. 650 milliGRAM(s) Oral every 6 hours PRN Temp greater or equal to 38C (100.4F), Mild Pain (1 - 3)  aluminum hydroxide/magnesium hydroxide/simethicone Suspension 30 milliLiter(s) Oral every 4 hours PRN Dyspepsia  melatonin 3 milliGRAM(s) Oral at bedtime PRN Insomnia  ondansetron Injectable 4 milliGRAM(s) IV Push every 8 hours PRN Nausea and/or Vomiting

## 2024-08-02 NOTE — PROGRESS NOTE ADULT - RESPIRATORY
clear to auscultation bilaterally/no wheezes/no rales/no rhonchi/no respiratory distress/no use of accessory muscles/airway patent/breath sounds equal/good air movement
clear to auscultation bilaterally/no wheezes/no rales/no rhonchi/no respiratory distress/no use of accessory muscles/airway patent/breath sounds equal/good air movement

## 2024-08-02 NOTE — DIETITIAN INITIAL EVALUATION ADULT - PERTINENT LABORATORY DATA
08-02    141  |  106  |  10  ----------------------------<  107<H>  3.5   |  31  |  1.15    Ca    8.7      02 Aug 2024 05:26  Phos  2.9     08-01  Mg     1.8     08-01    TPro  6.0  /  Alb  3.3<L>  /  TBili  1.4<H>  /  DBili  x   /  AST  34  /  ALT  28  /  AlkPhos  75  08-01  POCT Blood Glucose.: 107 mg/dL (08-02-24 @ 07:45)  A1C with Estimated Average Glucose Result: 6.6 % (08-01-24 @ 06:20)

## 2024-08-02 NOTE — DIETITIAN INITIAL EVALUATION ADULT - OTHER INFO
Nutrition interview: No recent episodes of nausea, vomiting, diarrhea or constipation per pt. Last BM noted on 8/1 per pt. Denies any chewing/swallowing difficulties. Pt is tolerating current clear liquid diet order, intake is % per pt. Food preferences explored and forwarded to dietary.  W Plasty Text: The lesion was extirpated to the level of the fat with a #15 scalpel blade.  Given the location of the defect, shape of the defect and the proximity to free margins a W-plasty was deemed most appropriate for repair.  Using a sterile surgical marker, the appropriate transposition arms of the W-plasty were drawn incorporating the defect and placing the expected incisions within the relaxed skin tension lines where possible.    The area thus outlined was incised deep to adipose tissue with a #15 scalpel blade.  The skin margins were undermined to an appropriate distance in all directions utilizing iris scissors.  The opposing transposition arms were then transposed into place in opposite direction and anchored with interrupted buried subcutaneous sutures.

## 2024-08-02 NOTE — DISCHARGE NOTE NURSING/CASE MANAGEMENT/SOCIAL WORK - PATIENT PORTAL LINK FT
You can access the FollowMyHealth Patient Portal offered by [facility] by registering at the following website: [email or web address]. By joining [facility]’s FollowMyHealth portal, you will also be able to view your health information using other applications (apps) compatible with our system.

## 2024-08-02 NOTE — DIETITIAN INITIAL EVALUATION ADULT - ADD RECOMMEND
1) Recommend to advance diet to low fiber as medically feasible.   2) Encourage PO intake and honor food preferences as able.  3) Monitor PO intake, labs, weights, BM's, and skin integrity.   4) Recommend ensure clear 2 x day to aid in pt's energy intake while pt is on current diet

## 2024-08-02 NOTE — DIETITIAN INITIAL EVALUATION ADULT - LITERATURE/VIDEOS GIVEN
Provided low fiber written/verbal education to pt in Burmese and english. Provided content of fiber in foods.

## 2024-08-02 NOTE — DIETITIAN INITIAL EVALUATION ADULT - PERSON TAUGHT/METHOD
verbal instruction/written material/teach back - (Patient repeats in own words)/patient instructed/spouse instructed/daughter instructed

## 2024-08-02 NOTE — DIETITIAN INITIAL EVALUATION ADULT - ORAL INTAKE PTA/DIET HISTORY
Spoke to pt at bedside, pt is Canadian speaking, offered , pt declined, offered to call granddaughter as a , granddaughter translated for writer. Pt reported no new food allergies or intolerances. Pt does not take any vitamins or supplements at home. Pt's intake was good PTA. Reported UBW:180 lbs, no recent significant weight changes.

## 2024-08-02 NOTE — CHART NOTE - NSCHARTNOTEFT_GEN_A_CORE
Spoke with Dr Mercedes regarding resuming Xarelto upon discharge. Dr. Mercedes confirmed that it is OK to resume home Xarelto upon discharge & follow up with out/patient GI.

## 2024-08-02 NOTE — PROGRESS NOTE ADULT - NEGATIVE GASTROINTESTINAL SYMPTOMS
no nausea/no vomiting/no abdominal pain/no melena/no steatorrhea/no jaundice/no hiccoughs
no nausea/no vomiting/no abdominal pain/no melena/no steatorrhea/no jaundice/no hiccoughs

## 2024-08-02 NOTE — PROGRESS NOTE ADULT - NEGATIVE OPHTHALMOLOGIC SYMPTOMS
no diplopia/no photophobia/no lacrimation L/no lacrimation R/no blurred vision L/no blurred vision R/no discharge L/no discharge R/no pain L/no irritation L/no irritation R/no scleral injection L/no scleral injection R
no diplopia/no photophobia/no lacrimation L/no lacrimation R/no blurred vision L/no blurred vision R/no discharge L/no discharge R/no pain L/no irritation L/no irritation R/no scleral injection L/no scleral injection R

## 2024-08-02 NOTE — DIETITIAN INITIAL EVALUATION ADULT - ENERGY INTAKE
Inadequate diet > 3 days Doxycycline Pregnancy And Lactation Text: This medication is Pregnancy Category D and not consider safe during pregnancy. It is also excreted in breast milk but is considered safe for shorter treatment courses.

## 2024-08-02 NOTE — PROGRESS NOTE ADULT - ASSESSMENT
1. Diarrhea improved  2. Rectal bleeding stopped  3. Diverticular bleeding stopped  5. Anemia H/H stable  6. S/p colonoscopy    Suggestions:    1. Monitor H/H  2. Transfuse PRBC as needed  3. Diet as tolerated   4. Protonix 40mg daily  5. Avoid NSAID  6. DVT prophylaxis

## 2024-08-02 NOTE — PROGRESS NOTE ADULT - GENITOURINARY
Encounter Date: 5/23/2018       History     Chief Complaint   Patient presents with    Knee Pain     right     This 44-year-old man was getting out of his [employer] [address] truck at when he twisted his knee and felt something pop since then he has been unable to bear weight on the knee and is very painful.  He has no prior history of injury to this knee.  He is in otherwise good health.          Review of patient's allergies indicates:  No Known Allergies  History reviewed. No pertinent past medical history.  Past Surgical History:   Procedure Laterality Date    SHOULDER SURGERY Right      History reviewed. No pertinent family history.  Social History   Substance Use Topics    Smoking status: Never Smoker    Smokeless tobacco: Not on file    Alcohol use No     Review of Systems   All other systems reviewed and are negative.      Physical Exam     Initial Vitals   BP Pulse Resp Temp SpO2   05/23/18 1754 05/23/18 1754 05/23/18 1754 05/23/18 1751 --   135/87 71 20 97.1 °F (36.2 °C)       MAP       05/23/18 1754       103         Physical Exam    Nursing note and vitals reviewed.  Constitutional: He appears well-developed and well-nourished.   HENT:   Mouth/Throat: Oropharynx is clear and moist.   Eyes: Conjunctivae are normal. Pupils are equal, round, and reactive to light.   Neck: Normal range of motion. Neck supple.   Cardiovascular: Normal heart sounds and intact distal pulses.   Pulmonary/Chest: Breath sounds normal.   Abdominal: Soft. Bowel sounds are normal.   Musculoskeletal: Normal range of motion. He exhibits no edema.   There is full range of motion at the knee but it is painful to move.  There is no effusion lateral ligaments appear intact.  There is no drawer sign.  The neurovascular exam is normal.   Skin: Skin is warm and dry.   Psychiatric: He has a normal mood and affect.         ED Course   Procedures  Labs Reviewed - No data to display                               Clinical Impression:   The encounter 
diagnosis was Sprain of right knee, unspecified ligament, initial encounter.    Disposition:   Disposition: Discharged  Condition: Stable                        Lyndon Garcia MD  05/23/18 [id number]    
details…
details…

## 2024-08-05 ENCOUNTER — TRANSCRIPTION ENCOUNTER (OUTPATIENT)
Age: 89
End: 2024-08-05

## 2024-08-05 ENCOUNTER — INPATIENT (INPATIENT)
Facility: HOSPITAL | Age: 89
LOS: 0 days | Discharge: ANOTHER HOSPITAL | DRG: 379 | End: 2024-08-05
Attending: INTERNAL MEDICINE | Admitting: INTERNAL MEDICINE
Payer: MEDICARE

## 2024-08-05 VITALS
HEART RATE: 56 BPM | DIASTOLIC BLOOD PRESSURE: 54 MMHG | WEIGHT: 176.37 LBS | RESPIRATION RATE: 18 BRPM | TEMPERATURE: 98 F | OXYGEN SATURATION: 99 % | HEIGHT: 62 IN | SYSTOLIC BLOOD PRESSURE: 130 MMHG

## 2024-08-05 VITALS — HEART RATE: 54 BPM | RESPIRATION RATE: 18 BRPM | OXYGEN SATURATION: 100 %

## 2024-08-05 DIAGNOSIS — M10.9 GOUT, UNSPECIFIED: ICD-10-CM

## 2024-08-05 DIAGNOSIS — K92.2 GASTROINTESTINAL HEMORRHAGE, UNSPECIFIED: ICD-10-CM

## 2024-08-05 DIAGNOSIS — I10 ESSENTIAL (PRIMARY) HYPERTENSION: ICD-10-CM

## 2024-08-05 DIAGNOSIS — Z29.9 ENCOUNTER FOR PROPHYLACTIC MEASURES, UNSPECIFIED: ICD-10-CM

## 2024-08-05 DIAGNOSIS — E78.5 HYPERLIPIDEMIA, UNSPECIFIED: ICD-10-CM

## 2024-08-05 DIAGNOSIS — E11.9 TYPE 2 DIABETES MELLITUS WITHOUT COMPLICATIONS: ICD-10-CM

## 2024-08-05 DIAGNOSIS — I48.91 UNSPECIFIED ATRIAL FIBRILLATION: ICD-10-CM

## 2024-08-05 PROBLEM — I63.9 CEREBRAL INFARCTION, UNSPECIFIED: Chronic | Status: ACTIVE | Noted: 2024-07-31

## 2024-08-05 LAB
ALBUMIN SERPL ELPH-MCNC: 3.1 G/DL — LOW (ref 3.5–5)
ALP SERPL-CCNC: 99 U/L — SIGNIFICANT CHANGE UP (ref 40–120)
ALT FLD-CCNC: 44 U/L DA — SIGNIFICANT CHANGE UP (ref 10–60)
ANION GAP SERPL CALC-SCNC: 5 MMOL/L — SIGNIFICANT CHANGE UP (ref 5–17)
APPEARANCE UR: CLEAR — SIGNIFICANT CHANGE UP
APTT BLD: 32.8 SEC — SIGNIFICANT CHANGE UP (ref 24.5–35.6)
AST SERPL-CCNC: 54 U/L — HIGH (ref 10–40)
BASOPHILS # BLD AUTO: 0.04 K/UL — SIGNIFICANT CHANGE UP (ref 0–0.2)
BASOPHILS NFR BLD AUTO: 0.5 % — SIGNIFICANT CHANGE UP (ref 0–2)
BILIRUB SERPL-MCNC: 1.1 MG/DL — SIGNIFICANT CHANGE UP (ref 0.2–1.2)
BILIRUB UR-MCNC: NEGATIVE — SIGNIFICANT CHANGE UP
BUN SERPL-MCNC: 14 MG/DL — SIGNIFICANT CHANGE UP (ref 7–18)
CALCIUM SERPL-MCNC: 8.3 MG/DL — LOW (ref 8.4–10.5)
CHLORIDE SERPL-SCNC: 108 MMOL/L — SIGNIFICANT CHANGE UP (ref 96–108)
CO2 SERPL-SCNC: 26 MMOL/L — SIGNIFICANT CHANGE UP (ref 22–31)
COLOR SPEC: YELLOW — SIGNIFICANT CHANGE UP
CREAT SERPL-MCNC: 1.53 MG/DL — HIGH (ref 0.5–1.3)
DIFF PNL FLD: NEGATIVE — SIGNIFICANT CHANGE UP
EGFR: 44 ML/MIN/1.73M2 — LOW
EOSINOPHIL # BLD AUTO: 0.05 K/UL — SIGNIFICANT CHANGE UP (ref 0–0.5)
EOSINOPHIL NFR BLD AUTO: 0.6 % — SIGNIFICANT CHANGE UP (ref 0–6)
GLUCOSE BLDC GLUCOMTR-MCNC: 104 MG/DL — HIGH (ref 70–99)
GLUCOSE SERPL-MCNC: 109 MG/DL — HIGH (ref 70–99)
GLUCOSE UR QL: NEGATIVE MG/DL — SIGNIFICANT CHANGE UP
HCT VFR BLD CALC: 23.6 % — LOW (ref 39–50)
HCT VFR BLD CALC: 23.7 % — LOW (ref 39–50)
HCT VFR BLD CALC: 25.5 % — LOW (ref 39–50)
HCT VFR BLD CALC: 26.2 % — LOW (ref 39–50)
HGB BLD-MCNC: 7.4 G/DL — LOW (ref 13–17)
HGB BLD-MCNC: 7.7 G/DL — LOW (ref 13–17)
HGB BLD-MCNC: 8.5 G/DL — LOW (ref 13–17)
HGB BLD-MCNC: 8.8 G/DL — LOW (ref 13–17)
IMM GRANULOCYTES NFR BLD AUTO: 0.5 % — SIGNIFICANT CHANGE UP (ref 0–0.9)
INR BLD: 1.18 RATIO — SIGNIFICANT CHANGE UP (ref 0.85–1.18)
INR BLD: 2.21 RATIO — HIGH (ref 0.85–1.18)
KETONES UR-MCNC: NEGATIVE MG/DL — SIGNIFICANT CHANGE UP
LEUKOCYTE ESTERASE UR-ACNC: NEGATIVE — SIGNIFICANT CHANGE UP
LIDOCAIN IGE QN: 48 U/L — SIGNIFICANT CHANGE UP (ref 13–75)
LYMPHOCYTES # BLD AUTO: 1.95 K/UL — SIGNIFICANT CHANGE UP (ref 1–3.3)
LYMPHOCYTES # BLD AUTO: 23.6 % — SIGNIFICANT CHANGE UP (ref 13–44)
MCHC RBC-ENTMCNC: 26.1 PG — LOW (ref 27–34)
MCHC RBC-ENTMCNC: 27.5 PG — SIGNIFICANT CHANGE UP (ref 27–34)
MCHC RBC-ENTMCNC: 27.9 PG — SIGNIFICANT CHANGE UP (ref 27–34)
MCHC RBC-ENTMCNC: 28.1 PG — SIGNIFICANT CHANGE UP (ref 27–34)
MCHC RBC-ENTMCNC: 31.2 GM/DL — LOW (ref 32–36)
MCHC RBC-ENTMCNC: 32.6 GM/DL — SIGNIFICANT CHANGE UP (ref 32–36)
MCHC RBC-ENTMCNC: 33.3 GM/DL — SIGNIFICANT CHANGE UP (ref 32–36)
MCHC RBC-ENTMCNC: 33.6 GM/DL — SIGNIFICANT CHANGE UP (ref 32–36)
MCV RBC AUTO: 83.5 FL — SIGNIFICANT CHANGE UP (ref 80–100)
MCV RBC AUTO: 83.6 FL — SIGNIFICANT CHANGE UP (ref 80–100)
MCV RBC AUTO: 83.7 FL — SIGNIFICANT CHANGE UP (ref 80–100)
MCV RBC AUTO: 84.3 FL — SIGNIFICANT CHANGE UP (ref 80–100)
MONOCYTES # BLD AUTO: 1.06 K/UL — HIGH (ref 0–0.9)
MONOCYTES NFR BLD AUTO: 12.8 % — SIGNIFICANT CHANGE UP (ref 2–14)
NEUTROPHILS # BLD AUTO: 5.11 K/UL — SIGNIFICANT CHANGE UP (ref 1.8–7.4)
NEUTROPHILS NFR BLD AUTO: 62 % — SIGNIFICANT CHANGE UP (ref 43–77)
NITRITE UR-MCNC: NEGATIVE — SIGNIFICANT CHANGE UP
NRBC # BLD: 0 /100 WBCS — SIGNIFICANT CHANGE UP (ref 0–0)
PH UR: 5 — SIGNIFICANT CHANGE UP (ref 5–8)
PLATELET # BLD AUTO: 111 K/UL — LOW (ref 150–400)
PLATELET # BLD AUTO: 130 K/UL — LOW (ref 150–400)
PLATELET # BLD AUTO: 149 K/UL — LOW (ref 150–400)
PLATELET # BLD AUTO: 179 K/UL — SIGNIFICANT CHANGE UP (ref 150–400)
POTASSIUM SERPL-MCNC: 3.8 MMOL/L — SIGNIFICANT CHANGE UP (ref 3.5–5.3)
POTASSIUM SERPL-SCNC: 3.8 MMOL/L — SIGNIFICANT CHANGE UP (ref 3.5–5.3)
PROT SERPL-MCNC: 6 G/DL — SIGNIFICANT CHANGE UP (ref 6–8.3)
PROT UR-MCNC: NEGATIVE MG/DL — SIGNIFICANT CHANGE UP
PROTHROM AB SERPL-ACNC: 13.4 SEC — HIGH (ref 9.5–13)
PROTHROM AB SERPL-ACNC: 24.6 SEC — HIGH (ref 9.5–13)
RBC # BLD: 2.8 M/UL — LOW (ref 4.2–5.8)
RBC # BLD: 2.84 M/UL — LOW (ref 4.2–5.8)
RBC # BLD: 3.05 M/UL — LOW (ref 4.2–5.8)
RBC # BLD: 3.13 M/UL — LOW (ref 4.2–5.8)
RBC # FLD: 16.2 % — HIGH (ref 10.3–14.5)
RBC # FLD: 16.4 % — HIGH (ref 10.3–14.5)
RBC # FLD: 16.8 % — HIGH (ref 10.3–14.5)
RBC # FLD: 18.2 % — HIGH (ref 10.3–14.5)
SODIUM SERPL-SCNC: 139 MMOL/L — SIGNIFICANT CHANGE UP (ref 135–145)
SP GR SPEC: 1.02 — SIGNIFICANT CHANGE UP (ref 1–1.03)
UROBILINOGEN FLD QL: 0.2 MG/DL — SIGNIFICANT CHANGE UP (ref 0.2–1)
WBC # BLD: 6.69 K/UL — SIGNIFICANT CHANGE UP (ref 3.8–10.5)
WBC # BLD: 6.84 K/UL — SIGNIFICANT CHANGE UP (ref 3.8–10.5)
WBC # BLD: 6.91 K/UL — SIGNIFICANT CHANGE UP (ref 3.8–10.5)
WBC # BLD: 8.25 K/UL — SIGNIFICANT CHANGE UP (ref 3.8–10.5)
WBC # FLD AUTO: 6.69 K/UL — SIGNIFICANT CHANGE UP (ref 3.8–10.5)
WBC # FLD AUTO: 6.84 K/UL — SIGNIFICANT CHANGE UP (ref 3.8–10.5)
WBC # FLD AUTO: 6.91 K/UL — SIGNIFICANT CHANGE UP (ref 3.8–10.5)
WBC # FLD AUTO: 8.25 K/UL — SIGNIFICANT CHANGE UP (ref 3.8–10.5)

## 2024-08-05 PROCEDURE — 81003 URINALYSIS AUTO W/O SCOPE: CPT

## 2024-08-05 PROCEDURE — 82962 GLUCOSE BLOOD TEST: CPT

## 2024-08-05 PROCEDURE — P9059: CPT

## 2024-08-05 PROCEDURE — 86900 BLOOD TYPING SEROLOGIC ABO: CPT

## 2024-08-05 PROCEDURE — 71045 X-RAY EXAM CHEST 1 VIEW: CPT | Mod: 26

## 2024-08-05 PROCEDURE — 74177 CT ABD & PELVIS W/CONTRAST: CPT | Mod: MC

## 2024-08-05 PROCEDURE — 85025 COMPLETE CBC W/AUTO DIFF WBC: CPT

## 2024-08-05 PROCEDURE — 99285 EMERGENCY DEPT VISIT HI MDM: CPT

## 2024-08-05 PROCEDURE — 74177 CT ABD & PELVIS W/CONTRAST: CPT | Mod: 26,MC

## 2024-08-05 PROCEDURE — 99233 SBSQ HOSP IP/OBS HIGH 50: CPT

## 2024-08-05 PROCEDURE — 36430 TRANSFUSION BLD/BLD COMPNT: CPT

## 2024-08-05 PROCEDURE — 80299 QUANTITATIVE ASSAY DRUG: CPT

## 2024-08-05 PROCEDURE — 80053 COMPREHEN METABOLIC PANEL: CPT

## 2024-08-05 PROCEDURE — 86901 BLOOD TYPING SEROLOGIC RH(D): CPT

## 2024-08-05 PROCEDURE — 85730 THROMBOPLASTIN TIME PARTIAL: CPT

## 2024-08-05 PROCEDURE — 86923 COMPATIBILITY TEST ELECTRIC: CPT

## 2024-08-05 PROCEDURE — 87640 STAPH A DNA AMP PROBE: CPT

## 2024-08-05 PROCEDURE — 93005 ELECTROCARDIOGRAM TRACING: CPT

## 2024-08-05 PROCEDURE — 85027 COMPLETE CBC AUTOMATED: CPT

## 2024-08-05 PROCEDURE — T1013: CPT

## 2024-08-05 PROCEDURE — 86850 RBC ANTIBODY SCREEN: CPT

## 2024-08-05 PROCEDURE — 83690 ASSAY OF LIPASE: CPT

## 2024-08-05 PROCEDURE — 93010 ELECTROCARDIOGRAM REPORT: CPT

## 2024-08-05 PROCEDURE — 87641 MR-STAPH DNA AMP PROBE: CPT

## 2024-08-05 PROCEDURE — P9040: CPT

## 2024-08-05 PROCEDURE — 85610 PROTHROMBIN TIME: CPT

## 2024-08-05 PROCEDURE — 36415 COLL VENOUS BLD VENIPUNCTURE: CPT

## 2024-08-05 PROCEDURE — 36620 INSERTION CATHETER ARTERY: CPT | Mod: GC

## 2024-08-05 PROCEDURE — 71045 X-RAY EXAM CHEST 1 VIEW: CPT

## 2024-08-05 RX ORDER — ONDANSETRON HCL/PF 4 MG/2 ML
4 VIAL (ML) INJECTION EVERY 8 HOURS
Refills: 0 | Status: DISCONTINUED | OUTPATIENT
Start: 2024-08-05 | End: 2024-08-05

## 2024-08-05 RX ORDER — METOPROLOL TARTRATE 100 MG
1 TABLET ORAL
Refills: 0 | DISCHARGE

## 2024-08-05 RX ORDER — PROTHROMBIN COMPLEX CONCENTRATE (HUMAN) 25.5; 16.5; 24; 22; 22; 26 [IU]/ML; [IU]/ML; [IU]/ML; [IU]/ML; [IU]/ML; [IU]/ML
4000 POWDER, FOR SOLUTION INTRAVENOUS ONCE
Refills: 0 | Status: COMPLETED | OUTPATIENT
Start: 2024-08-05 | End: 2024-08-05

## 2024-08-05 RX ORDER — ATORVASTATIN CALCIUM 40 MG/1
20 TABLET, FILM COATED ORAL AT BEDTIME
Refills: 0 | Status: DISCONTINUED | OUTPATIENT
Start: 2024-08-05 | End: 2024-08-05

## 2024-08-05 RX ORDER — RIVAROXABAN 15 MG-20MG
1 KIT ORAL
Refills: 0 | DISCHARGE

## 2024-08-05 RX ORDER — BACTERIOSTATIC SODIUM CHLORIDE 0.9 %
1000 VIAL (ML) INJECTION ONCE
Refills: 0 | Status: COMPLETED | OUTPATIENT
Start: 2024-08-05 | End: 2024-08-05

## 2024-08-05 RX ORDER — CHLORHEXIDINE GLUCONATE 500 MG/1
1 CLOTH TOPICAL
Refills: 0 | Status: DISCONTINUED | OUTPATIENT
Start: 2024-08-05 | End: 2024-08-05

## 2024-08-05 RX ORDER — PANTOPRAZOLE SODIUM 20 MG/1
40 TABLET, DELAYED RELEASE ORAL
Refills: 0 | Status: DISCONTINUED | OUTPATIENT
Start: 2024-08-05 | End: 2024-08-05

## 2024-08-05 RX ORDER — BACTERIOSTATIC SODIUM CHLORIDE 0.9 %
1000 VIAL (ML) INJECTION
Refills: 0 | Status: DISCONTINUED | OUTPATIENT
Start: 2024-08-05 | End: 2024-08-05

## 2024-08-05 RX ORDER — TAMSULOSIN HCL 0.4 MG
0.4 CAPSULE ORAL AT BEDTIME
Refills: 0 | Status: DISCONTINUED | OUTPATIENT
Start: 2024-08-05 | End: 2024-08-05

## 2024-08-05 RX ORDER — FENTANYL CITRATE 1200 UG/1
25 LOZENGE ORAL; TRANSMUCOSAL ONCE
Refills: 0 | Status: DISCONTINUED | OUTPATIENT
Start: 2024-08-05 | End: 2024-08-05

## 2024-08-05 RX ORDER — PANTOPRAZOLE SODIUM 20 MG/1
80 TABLET, DELAYED RELEASE ORAL ONCE
Refills: 0 | Status: COMPLETED | OUTPATIENT
Start: 2024-08-05 | End: 2024-08-05

## 2024-08-05 RX ORDER — METOPROLOL TARTRATE 100 MG
50 TABLET ORAL DAILY
Refills: 0 | Status: DISCONTINUED | OUTPATIENT
Start: 2024-08-05 | End: 2024-08-05

## 2024-08-05 RX ADMIN — Medication 1000 MILLILITER(S): at 04:36

## 2024-08-05 RX ADMIN — Medication 1000 MILLILITER(S): at 05:01

## 2024-08-05 RX ADMIN — PANTOPRAZOLE SODIUM 80 MILLIGRAM(S): 20 TABLET, DELAYED RELEASE ORAL at 06:51

## 2024-08-05 RX ADMIN — CHLORHEXIDINE GLUCONATE 1 APPLICATION(S): 500 CLOTH TOPICAL at 16:36

## 2024-08-05 RX ADMIN — Medication 75 MILLILITER(S): at 13:46

## 2024-08-05 RX ADMIN — FENTANYL CITRATE 25 MICROGRAM(S): 1200 LOZENGE ORAL; TRANSMUCOSAL at 20:13

## 2024-08-05 RX ADMIN — FENTANYL CITRATE 25 MICROGRAM(S): 1200 LOZENGE ORAL; TRANSMUCOSAL at 20:43

## 2024-08-05 RX ADMIN — PROTHROMBIN COMPLEX CONCENTRATE (HUMAN) 400 INTERNATIONAL UNIT(S): 25.5; 16.5; 24; 22; 22; 26 POWDER, FOR SOLUTION INTRAVENOUS at 06:52

## 2024-08-05 RX ADMIN — PANTOPRAZOLE SODIUM 40 MILLIGRAM(S): 20 TABLET, DELAYED RELEASE ORAL at 17:43

## 2024-08-05 NOTE — ED PROVIDER NOTE - INTERNATIONAL TRAVEL
Discharge Summary    Date of Admission: 2023  Date of Discharge:  2023      Patient: Isis Yun      MR#:[id number]    Primary Surgeon/OB: Harper Lang MD    Discharge Surgeon/OB: Rickey/Charley BOURNE    Presenting Problem/History of Present Illness  Pregnancy [Z34.90]  Pregnancy [Z34.90]  Postpartum care following  delivery [Z39.2]     Patient Active Problem List    Diagnosis     Postpartum care following  delivery [Z39.2]     Macrosomia affecting management of mother in third trimester [O36.63X0]     Polyhydramnios in third trimester [O40.3XX0]     Multigravida of advanced maternal age in third trimester [O09.523]     Hypothyroidism (acquired) [E03.9]     Sjogren's syndrome [M35.00]     Postpartum depression [F53.0]     Previous  delivery affecting pregnancy [O34.219]     History of myomectomy [Z98.890]          Discharge Diagnosis:  section at 38w0d    Procedures:  , Low Transverse     2023    9:55 AM            Discharge Date: 2023; Discharge Time: 10:22 EDT        Hospital Course  Patient is a 38 y.o. female  at 38w0d status post  section with uneventful postoperative recovery.  Patient was advanced to regular diet on postoperative day#1.  On discharge, ambulating, tolerating a regular diet without any difficulties and her incision is dry, clean and intact.     Infant:   male  fetus 4655 g (10 lb 4.2 oz)  with Apgar scores of 8 , 9  at five minutes.    Condition on Discharge:  Stable    Vital Signs  Temp:  [98.2 øF (36.8 øC)-98.4 øF (36.9 øC)] 98.2 øF (36.8 øC)  Heart Rate:  [70-78] 78  Resp:  [16-18] 18  BP: (109)/(60-65) 109/63    Lab Results   Component Value Date    WBC 7.94 2023    HGB 11.2 (L) 2023    HCT 33.8 (L) 2023    MCV 94.4 2023     2023       Discharge Disposition  Home or Self Care    Discharge Medications     Discharge Medications        New Medications        Instructions  Start Date   ibuprofen 600 MG tablet  Commonly known as: ADVIL,MOTRIN   600 mg, Oral, Every 6 Hours             Continue These Medications        Instructions Start Date   albuterol sulfate  (90 Base) MCG/ACT inhaler  Commonly known as: PROVENTIL HFA;VENTOLIN HFA;PROAIR HFA   INHALE 2 PUFFS INTO THE LUNGS EVERY 6 HOURS IF NEEDED FOR WHEEZING      Breast Pump misc   Use PRN      calcium citrate-vitamin d 200-250 MG-UNIT tablet tablet  Commonly known as: CITRACAL   Oral, 2 Times Daily      Emgality 120 MG/ML auto-injector pen  Generic drug: galcanezumab-gnlm   INJECT 120 MG UNDER THE SKIN EVERY 30 DAYS      ferrous sulfate 325 (65 FE) MG tablet   Oral, Every 12 Hours      fluticasone-salmeterol 100-50 MCG/DOSE DISKUS  Commonly known as: ADVAIR   Inhalation, 2 Times Daily - RT      folic acid 1 MG tablet  Commonly known as: FOLVITE   1,000 mcg, Oral, 2 Times Daily      hydroxychloroquine 200 MG tablet  Commonly known as: PLAQUENIL   hydroxychloroquine 200 mg tablet   TAKE 1 TABLET BY MOUTH EVERY DAY      lansoprazole 30 MG capsule  Commonly known as: PREVACID   30 mg, Oral, Daily      levothyroxine 25 MCG tablet  Commonly known as: SYNTHROID, LEVOTHROID   25 mcg, Oral, Daily      montelukast 10 MG tablet  Commonly known as: SINGULAIR   Every 24 Hours      multivitamin with minerals tablet tablet   1 tablet, Oral, Daily      pilocarpine 5 MG tablet  Commonly known as: SALAGEN   1 tablet, Oral, 3 Times Daily      sertraline 50 MG tablet  Commonly known as: ZOLOFT   Take 1 tablet every day by oral route.      vitamin B-12 1000 MCG tablet  Commonly known as: CYANOCOBALAMIN   1,000 mcg, Oral, Daily      ZINC PO   zinc      zonisamide 100 MG capsule  Commonly known as: ZONEGRAN   500 mg, Once, 300 mg in am; 300 mg in pm             Stop These Medications      aspirin 81 MG chewable tablet     fexofenadine 180 MG tablet  Commonly known as: ALLEGRA                  Follow-up Appointments  Future Appointments   Date Time  Provider Department [place]   9/5/2023 11:00 AM Usha Spears, APRN [facility]  [facility]     Additional Instructions for the Follow-ups that You Need to Schedule       Call MD With Problems / Concerns   As directed      Instructions: Discussed changes in postpartum mood-anxiety, depression, sadness to call office for evaluation as soon as symptoms arise even if before 6 week appointment. Monitor for excessive bleeding >1 pad/hr for several hours, dizziness, syncope, fever or changes in blood pressure.    Order Comments: Instructions: Discussed changes in postpartum mood-anxiety, depression, sadness to call office for evaluation as soon as symptoms arise even if before 6 week appointment. Monitor for excessive bleeding >1 pad/hr for several hours, dizziness, syncope, fever or changes in blood pressure.         Discharge Follow-up with Specified Provider: ; 2 Weeks   As directed      To:    Follow Up: 2 Weeks                STEFFEN Kaminski  08/24/23  10:21 EDT  Csd       No

## 2024-08-05 NOTE — CONSULT NOTE ADULT - SUBJECTIVE AND OBJECTIVE BOX
Date of Service  08-05-24 @ 12:54   # [id number]    CHIEF COMPLAINT:Patient is a 88y old  Male who presents with a chief complaint of Diverticular bleeding,      HPI:  Patient is a 88M, from home ambulates independently, with PMHx of Afib (on xarelto), pre-DM, HTN, HLD, gout, CVA who presents with 1 day history of jame red bloody bowel movement. Patient was admitted on 7/30-8/2 with the same presentation s/p colonoscopy and clip placed on 8/1 on discharged back on Xarelto. Patient was in his usual state and since last night, patient has been having 2-3 painless jame bloody bowel movements with blood clots every hour. Patient endorses lightheadedness and shortness of breath after these bowel movements. Denies headache, palpitation chest pain, abdominal pain, dysuria.    Patient noted to have 3 more jame bloody BM in the ED.  (05 Aug 2024 09:41)      PAST MEDICAL & SURGICAL HISTORY:  Gout      Hypertension      Hyperlipidemia      CVA (cerebrovascular accident)          MEDICATIONS  (STANDING):  atorvastatin 20 milliGRAM(s) Oral at bedtime  pantoprazole    Tablet 40 milliGRAM(s) Oral before breakfast  tamsulosin 0.4 milliGRAM(s) Oral at bedtime    MEDICATIONS  (PRN):  ondansetron Injectable 4 milliGRAM(s) IV Push every 8 hours PRN Nausea and/or Vomiting      FAMILY HISTORY:No hx of CAD      SOCIAL HISTORY:    [x ] Non-smoker    [X ] Alcohol    Allergies    No Known Allergies    Intolerances    	    REVIEW OF SYSTEMS:  CONSTITUTIONAL: No fever, weight loss, or fatigue  EYES: No eye pain, visual disturbances, or discharge  ENT:  No difficulty hearing, tinnitus, vertigo; No sinus or throat pain  NECK: No pain or stiffness  RESPIRATORY: No cough, wheezing, chills or hemoptysis; No Shortness of Breath  CARDIOVASCULAR: No chest pain, palpitations, passing out, dizziness, or leg swelling  GASTROINTESTINAL: No abdominal or epigastric pain. No nausea, vomiting, or hematemesis; No diarrhea or constipation. + hematochezia.  GENITOURINARY: No dysuria, frequency, hematuria, or incontinence  NEUROLOGICAL: No headaches, memory loss, loss of strength, numbness, or tremors  SKIN: No itching, burning, rashes, or lesions   LYMPH Nodes: No enlarged glands  ENDOCRINE: No heat or cold intolerance; No hair loss  MUSCULOSKELETAL: No joint pain or swelling; No muscle, back, or extremity pain  PSYCHIATRIC: No depression, anxiety, mood swings, or difficulty sleeping  HEME/LYMPH: No easy bruising, or bleeding gums  ALLERGY AND IMMUNOLOGIC: No hives or eczema	      PHYSICAL EXAM:  T(C): 36.3 (08-05-24 @ 11:25), Max: 36.4 (08-05-24 @ 02:34)  HR: 53 (08-05-24 @ 11:25) (47 - 56)  BP: 130/61 (08-05-24 @ 11:25) (120/74 - 148/85)  RR: 19 (08-05-24 @ 11:25) (15 - 20)  SpO2: 99% (08-05-24 @ 11:25) (98% - 99%)  Wt(kg): --  I&O's Summary      Appearance: Normal	  HEENT:   Normal oral mucosa, PERRL, EOMI	  Lymphatic: No lymphadenopathy  Cardiovascular: Normal S1 S2, No JVD, No murmurs, No edema  Respiratory: Lungs clear to auscultation	  Psychiatry: A & O x 3, Mood & affect appropriate  Gastrointestinal:  Soft, Non-tender, + BS	  Skin: No rashes, No ecchymoses, No cyanosis	  Neurologic: Non-focal  Extremities: Normal range of motion, No clubbing, cyanosis or edema  Vascular: Peripheral pulses palpable 2+ bilaterally    	    ECG:  afib  44 bpm	    LABS:	 	                          7.4    8.25  )-----------( 179      ( 05 Aug 2024 04:36 )             23.7     08-05    139  |  108  |  14  ----------------------------<  109<H>  3.8   |  26  |  1.53<H>    Ca    8.3<L>      05 Aug 2024 04:36    TPro  6.0  /  Alb  3.1<L>  /  TBili  1.1  /  DBili  x   /  AST  54<H>  /  ALT  44  /  AlkPhos  99  08-05    < from: CT Abdomen and Pelvis w/ IV Cont (08.05.24 @ 06:10) >  ACC: [id number] EXAM:  CT ABDOMEN AND PELVIS IC   ORDERED BY: GOLDIE FAY     PROCEDURE DATE:  08/05/2024          INTERPRETATION:  CLINICAL INFORMATION: GI bleed.    COMPARISON: CT abdomen and pelvis 7/31/2024..    CONTRAST/COMPLICATIONS:  IV Contrast: Omnipaque 350  90 cc administered   10 cc discarded  Oral Contrast: NONE  Complications: None reported at time of study completion    PROCEDURE:  CT of the Abdomen and Pelvis was performed.  Precontrast, Arterial and Delayed phases were performed.  Sagittal and coronal reformats were performed.    FINDINGS:  LOWER CHEST: Cardiomegaly. Coronary artery calcifications. Elevated left   hemidiaphragm with adjacent compressive atelectasis.    LIVER: Steatosis.  BILE DUCTS: Normal caliber.  GALLBLADDER: Sludge and/or gallstones.  SPLEEN: Within normal limits.  PANCREAS: Within normal limits.  ADRENALS: Within normal limits.  KIDNEYS/URETERS: No hydronephrosis. Bilateral renal cysts as well as too   small to characterize hyperdensities..    BLADDER:Within normal limits.  REPRODUCTIVE ORGANS: No prostatomegaly.    BOWEL: No bowel obstruction. Diverticulosis coli. Intraluminal   extravasation of contrast identified in mid to lower descending colonic   loop (42-45:13), compatible with active GI bleed. No overt bowel wall   thickening.  PERITONEUM/RETROPERITONEUM: Within normal limits.  VESSELS: Atherosclerotic changes.  LYMPH NODES: No lymphadenopathy.  ABDOMINAL WALL: Within normal limits.  BONES: Degenerative changes.    IMPRESSION:    Active GI bleed in mid to distal descending colonic loop. Further GI   workup and imaging follow-up is suggested. Diverticulosis coli.    Additional findings as mentioned above.    These critical results were discussed via telephone at 8/5/2024 6:37 AM   by Dr. Euceda of radiology with Dr. Fay.    --- End of Report ---        < from: Colonoscopy (08.01.24 @ 16:00) >  This is a average risk patient  undergoing a diagnostic colonoscopy.        The procedure, indications, preparation and potential complications were    explained to the patient, who indicated understanding and signed the    corresponding consent forms. MAC was administered by the anesthesiologist.    Continuous pulse oximetry and blood pressure monitoring were used throughout the    procedure. Supplemental oxygen was used. The quality of preparation was 6-9 on    the BBP scale/adequate. Patient was placed in left lateral decubitus position.    Digital exam was normal. The colonoscope was introduced through the rectum and    advanced under direct visualization until cecum was reached. The appendiceal    orifice and the ileocecal valve were identified. Careful visualization was    performed as the instrument was withdrawn. Retroflexion in the rectum was    performed successfully and there were grade I hemorrhoids. Patient tolerance to    procedure was excellent. The procedure was not difficult. Blood loss was none..            Rod[place] Bowel Preparation Score:        Using the [place] Bowel Preparation Score, each segment of the colon was graded    as follows:        Left Colon: Good, 2 | Transverse Colon: Good, 2  | Right Colon: Good, 2        Limitations/Complications:        There were no apparent limitations or complications        Findings:        Excavated lesions Multiple diverticula were seen in the the left side of the    colon. There was a small mouthed diverticulum in the distal descending colon    which appearedmildly edematous with a small overlying erosion suspicious for    recent bleeding. One hemostatic clip (MR conditional) was placed over the    erosion. The clip was in good position. There was no bleeding at conclusion.        Protruding lesions Small internal and external hemorrhoids were noted.        Impressions:        Diverticular bleed, clip placed.        Plan:        Return to floor for further management        Advance diet as tolerated        Monitor for recurring GI bleeding        Heriberto Lynn        Version 1, Electronically signed on 8/1/2024 5:17:30 PM by Heriberto Lynn    < end of copied text >

## 2024-08-05 NOTE — CHART NOTE - NSCHARTNOTEFT_GEN_A_CORE
88M with PMHx of Afib (on xarelto), presents with BRBPR after restarting AC after recent bleeding episode (which resolved),    -s/p 2uPRBC 4:36 AM > 13:29 with minimal Hgb improvement (7.4 and 7.7) with continued bleeding through out the day, 2 more units going in now  -INR 2.2, kccentra administered this AM, pending repeat  -patient remains hemodynamically stable     CTA A/P Active GI bleed in descending colon.       Discussed with ICU attending:  given hemodynamic stability, No plan for embolization AT THIS TIME.  There is usually self-limiting clinical course of diverticular bleeds with medical therapy if anticoagulation is reversed.   continue trend cbc/vitals, transfuse prn per primary team, reversal of AC and trending of coagulation parameters.     ICU attending requests transfer to overnight-angiogram-capable facility in the event that the patient does hemodynamically decompensate and meet criteria for angiogram/embolization    after transfer, if patient demonstrates acute decompensation/hemodynamic instability not responsive to fluid resuscitation and transfusions, or if hemoglobin continues to drop significantly despite transfusion, please page IR  for re-evaluation for more urgent intervention. ([facility] [id number], [phone])       Emeka Diego MD  IR Attending [facility]  Available on [employer] Teams

## 2024-08-05 NOTE — H&P ADULT - PROBLEM SELECTOR PLAN 3
Hx of HTN on Valsartan-HCTZ, metoprolol   Holding meds in the setting of acute GI bleed and symptomatic anemia

## 2024-08-05 NOTE — CHART NOTE - NSCHARTNOTEFT_GEN_A_CORE
INITIAL ADVANCE GI CONSULTATION    Patient is a 88y old  Male who presents with a chief complaint of Diverticular bleeding (05 Aug 2024 13:18)    HPI:  Patient is a 88M, from home ambulates independently, with PMHx of Afib (on xarelto), pre-DM, HTN, HLD, gout, CVA who presents with 1 day history of jame red bloody bowel movement. Patient was admitted on 7/30-8/2 with the same presentation s/p colonoscopy and clip placed on 8/1 on discharged back on Xarelto. Patient was in his usual state and since last night, patient has been having 2-3 painless jame bloody bowel movements with blood clots every hour. Patient endorses lightheadedness and shortness of breath after these bowel movements. Denies headache, palpitation chest pain, abdominal pain, dysuria.    Patient noted to have 3 more jame bloody BM in the ED.  (05 Aug 2024 09:41)    GI HPI: Pt seen and examined. Family at bedside. Azeri interpreted by grand-daughter Carl per pt /family preference.  Pt awake/alert.Pt was discharged Friday.  Pt resumed  Xarelto.BRBPR started yesterday evening.  Pt denies acute discomfort, dizziness, SOB, chest discomfort , N/V/abdominal discomfort, rectal discomfort.     s/p colonoscopy 8/1 findings of diverticular bleed, clip placed.   PMH/PSH:  PAST MEDICAL & SURGICAL HISTORY:  Gout      Hypertension      Hyperlipidemia      CVA (cerebrovascular accident)        FH:  FAMILY HISTORY:      Social History:       MEDS:  MEDICATIONS  (STANDING):  atorvastatin 20 milliGRAM(s) Oral at bedtime  pantoprazole    Tablet 40 milliGRAM(s) Oral before breakfast  sodium chloride 0.9%. 1000 milliLiter(s) (75 mL/Hr) IV Continuous <Continuous>  tamsulosin 0.4 milliGRAM(s) Oral at bedtime    MEDICATIONS  (PRN):  ondansetron Injectable 4 milliGRAM(s) IV Push every 8 hours PRN Nausea and/or Vomiting    Allergies    No Known Allergies    Intolerances        CONSTITUTIONAL:  No weight loss, fever, chills, weakness or fatigue.  HEENT:  Eyes:  No visual loss, blurred vision, double vision or yellow sclerae. Ears, Nose, Throat:  No hearing loss, sneezing, congestion, runny nose or sore throat.  SKIN:  No rash or itching.  CARDIOVASCULAR:  No chest pain, chest pressure or chest discomfort. No palpitations or edema.  RESPIRATORY:  No shortness of breath, cough or sputum.  GASTROINTESTINAL:  SEE HPI  GENITOURINARY:  No dysuria, hematuria, urinary frequency  NEUROLOGICAL:  No headache, dizziness, syncope, paralysis, ataxia, numbness or tingling in the extremities. No change in bowel or bladder control.  MUSCULOSKELETAL:  No muscle, back pain, joint pain or stiffness.  HEMATOLOGIC:  (+) BRBPR.   LYMPHATICS:  No enlarged nodes. No history of splenectomy.  PSYCHIATRIC:  No history of depression or anxiety.  ENDOCRINOLOGIC:  No reports of sweating, cold or heat intolerance. No polyuria or polydipsia.      ______________________________________________________________________  PHYSICAL EXAM:  T(C): 36.7 (08-05-24 @ 13:46), Max: 36.7 (08-05-24 @ 13:46)  HR: 62 (08-05-24 @ 13:46)  BP: 149/62 (08-05-24 @ 13:46)  RR: 19 (08-05-24 @ 13:46)  SpO2: 98% (08-05-24 @ 13:46)  Wt(kg): --      GEN: NAD  HEENT: normocephalic, PERRL, anicteric  CVS: S1S2+  CHEST: clear to auscultation  ABD: soft , nontender, nondistended, bowel sounds present. HAY: (+) clots in rectal vault  EXTR: no cyanosis, no clubbing, no edema  NEURO: Awake and alert; oriented x3  SKIN:  warm;  non icteric    ______________________________________________________________________  LABS:                        7.7    6.84  )-----------( 149      ( 05 Aug 2024 13:29 )             23.6     08-05    139  |  108  |  14  ----------------------------<  109<H>  3.8   |  26  |  1.53<H>    Ca    8.3<L>      05 Aug 2024 04:36    TPro  6.0  /  Alb  3.1<L>  /  TBili  1.1  /  DBili  x   /  AST  54<H>  /  ALT  44  /  AlkPhos  99  08-05    LIVER FUNCTIONS - ( 05 Aug 2024 04:36 )  Alb: 3.1 g/dL / Pro: 6.0 g/dL / ALK PHOS: 99 U/L / ALT: 44 U/L DA / AST: 54 U/L / GGT: x           PT/INR - ( 05 Aug 2024 04:36 )   PT: 24.6 sec;   INR: 2.21 ratio         PTT - ( 05 Aug 2024 04:36 )  PTT:32.8 sec  ____________________________________________  IMAGING:     < from: CT Abdomen and Pelvis w/ IV Cont (08.05.24 @ 06:10) >      COMPARISON: CT abdomen and pelvis 7/31/2024..    CONTRAST/COMPLICATIONS:  IV Contrast: Omnipaque 350  90 cc administered   10 cc discarded  Oral Contrast: NONE  Complications: None reported at time of study completion    PROCEDURE:  CT of the Abdomen and Pelvis was performed.  Precontrast, Arterial and Delayed phases were performed.  Sagittal and coronal reformats were performed.    FINDINGS:  LOWER CHEST: Cardiomegaly. Coronary artery calcifications. Elevated left   hemidiaphragm with adjacent compressive atelectasis.    LIVER: Steatosis.  BILE DUCTS: Normal caliber.  GALLBLADDER: Sludge and/or gallstones.  SPLEEN: Within normal limits.  PANCREAS: Within normal limits.  ADRENALS: Within normal limits.  KIDNEYS/URETERS: No hydronephrosis. Bilateral renal cysts as well as too   small to characterize hyperdensities..    BLADDER:Within normal limits.  REPRODUCTIVE ORGANS: No prostatomegaly.    BOWEL: No bowel obstruction. Diverticulosis coli. Intraluminal   extravasation of contrast identified in mid to lower descending colonic   loop (42-45:13), compatible with active GI bleed. No overt bowel wall   thickening.  PERITONEUM/RETROPERITONEUM: Within normal limits.  VESSELS: Atherosclerotic changes.  LYMPH NODES: No lymphadenopathy.  ABDOMINAL WALL: Within normal limits.  BONES: Degenerative changes.    IMPRESSION:    Active GI bleed in mid to distal descending colonic loop. Further GI   workup and imaging follow-up is suggested. Diverticulosis coli.    Additional findings as mentioned above.    < end of copied text >    < from: Colonoscopy (08.01.24 @ 16:00) >    Impressions:        Diverticular bleed, clip placed.        Plan:        Return to floor for further management        Advance diet as tolerated        Monitor for recurring GI bleeding        Heriberto Lynn    < end of copied text >    -------------  Assessment/Plan:     Patient is a 88M, from home ambulates independently, with PMHx of Afib (on xarelto), pre-DM, HTN, HLD, gout, CVA who presents with 1 day history of jame red bloody bowel movement. Patient was admitted on 7/30-8/2 with the same presentation s/p colonoscopy and clip placed on 8/1 on discharged back on Xarelto. CT abd/pelvis w/IV contrast findings active GIB in mid to distal descending colonic loop.   GI Dr. Mercedes following.   Advance GI consulted.     #Hematochezia  #acute anemia    Monitor CBC q6h and transfuse for Hgb <7-8 or if symptomatic.   IR consult for embolization   Consider holding xarelto if okay per primary team and or cardiology     This note and its recommendations herein are preliminary until such time as cosigned by an attending. IN[facility]IAL [facility] GI CONSULTATION    Patient is a 88y old  Male who presents with a chief complaint of Diverticular bleeding (05 Aug 2024 13:18)    HPI:  Patient is a 88M, from home ambulates independently, with PMHx of Afib (on xarelto), pre-DM, HTN, HLD, gout, CVA who presents with 1 day history of jame red bloody bowel movement. Patient was admitted on 7/30-8/2 with the same presentation s/p colonoscopy and clip placed on 8/1 on discharged back on Xarelto. Patient was in his usual state and since last night, patient has been having 2-3 painless jame bloody bowel movements with blood clots every hour. Patient endorses lightheadedness and shortness of breath after these bowel movements. Denies headache, palpitation chest pain, abdominal pain, dysuria.    Patient noted to have 3 more jame bloody BM in the ED.  (05 Aug 2024 09:41)    GI HPI: Pt seen and examined. Family at bedside. Malay interpreted by grand-daughter Carl per pt /family preference.  Pt awake/alert.Pt was discharged Friday.  Pt resumed  Xarelto.BRBPR started yesterday evening.  Pt denies acute discomfort, dizziness, SOB, chest discomfort , N/V/abdominal discomfort, rectal discomfort.     s/p colonoscopy 8/1 findings of diverticular bleed, clip placed.   PMH/PSH:  PAST MEDICAL & SURGICAL HISTORY:  Gout      Hypertension      Hyperlipidemia      CVA (cerebrovascular accident)        FH:  FAMILY HISTORY:      Social History:       MEDS:  MEDICATIONS  (STANDING):  atorvastatin 20 milliGRAM(s) Oral at bedtime  pantoprazole    Tablet 40 milliGRAM(s) Oral before breakfast  sodium chloride 0.9%. 1000 milliLiter(s) (75 mL/Hr) IV Continuous <Continuous>  tamsulosin 0.4 milliGRAM(s) Oral at bedtime    MEDICATIONS  (PRN):  ondansetron Injectable 4 milliGRAM(s) IV Push every 8 hours PRN Nausea and/or Vomiting    Allergies    No Known Allergies    Intolerances        CONSTITUTIONAL:  No weight loss, fever, chills, weakness or fatigue.  HEENT:  Eyes:  No visual loss, blurred vision, double vision or yellow sclerae. Ears, Nose, Throat:  No hearing loss, sneezing, congestion, runny nose or sore throat.  SKIN:  No rash or itching.  CARDIOVASCULAR:  No chest pain, chest pressure or chest discomfort. No palpitations or edema.  RESPIRATORY:  No shortness of breath, cough or sputum.  GASTROINTESTINAL:  SEE HPI  GENITOURINARY:  No dysuria, hematuria, urinary frequency  NEUROLOGICAL:  No headache, dizziness, syncope, paralysis, ataxia, numbness or tingling in the extremities. No change in bowel or bladder control.  MUSCULOSKELETAL:  No muscle, back pain, joint pain or stiffness.  HEMATOLOGIC:  (+) BRBPR.   LYMPHATICS:  No enlarged nodes. No history of splenectomy.  PSYCHIATRIC:  No history of depression or anxiety.  ENDOCRINOLOGIC:  No reports of sweating, cold or heat intolerance. No polyuria or polydipsia.      ______________________________________________________________________  PHYSICAL EXAM:  T(C): 36.7 (08-05-24 @ 13:46), Max: 36.7 (08-05-24 @ 13:46)  HR: 62 (08-05-24 @ 13:46)  BP: 149/62 (08-05-24 @ 13:46)  RR: 19 (08-05-24 @ 13:46)  SpO2: 98% (08-05-24 @ 13:46)  Wt(kg): --      GEN: NAD  HEENT: normocephalic, PERRL, anicteric  CVS: S1S2+  CHEST: clear to auscultation  ABD: soft , nontender, nondistended, bowel sounds present. HAY: (+) clots in rectal vault  EXTR: no cyanosis, no clubbing, no edema  NEURO: Awake and alert; oriented x3  SKIN:  warm;  non icteric    ______________________________________________________________________  LABS:                        7.7    6.84  )-----------( 149      ( 05 Aug 2024 13:29 )             23.6     08-05    139  |  108  |  14  ----------------------------<  109<H>  3.8   |  26  |  1.53<H>    Ca    8.3<L>      05 Aug 2024 04:36    TPro  6.0  /  Alb  3.1<L>  /  TBili  1.1  /  DBili  x   /  AST  54<H>  /  ALT  44  /  AlkPhos  99  08-05    LIVER FUNCTIONS - ( 05 Aug 2024 04:36 )  Alb: 3.1 g/dL / Pro: 6.0 g/dL / ALK PHOS: 99 U/L / ALT: 44 U/L DA / AST: 54 U/L / GGT: x           PT/INR - ( 05 Aug 2024 04:36 )   PT: 24.6 sec;   INR: 2.21 ratio         PTT - ( 05 Aug 2024 04:36 )  PTT:32.8 sec  ____________________________________________  IMAGING:     < from: CT Abdomen and Pelvis w/ IV Cont (08.05.24 @ 06:10) >      COMPARISON: CT abdomen and pelvis 7/31/2024..    CONTRAST/COMPLICATIONS:  IV Contrast: Omnipaque 350  90 cc administered   10 cc discarded  Oral Contrast: NONE  Complications: None reported at time of study completion    PROCEDURE:  CT of the Abdomen and Pelvis was performed.  Precontrast, Arterial and Delayed phases were performed.  Sagittal and coronal reformats were performed.    FINDINGS:  LOWER CHEST: Cardiomegaly. Coronary artery calcifications. Elevated left   hemidiaphragm with adjacent compressive atelectasis.    LIVER: Steatosis.  BILE DUCTS: Normal caliber.  GALLBLADDER: Sludge and/or gallstones.  SPLEEN: Within normal limits.  PANCREAS: Within normal limits.  ADRENALS: Within normal limits.  KIDNEYS/URETERS: No hydronephrosis. Bilateral renal cysts as well as too   small to characterize hyperdensities..    BLADDER:Within normal limits.  REPRODUCTIVE ORGANS: No prostatomegaly.    BOWEL: No bowel obstruction. Diverticulosis coli. Intraluminal   extravasation of contrast identified in mid to lower descending colonic   loop (42-45:13), compatible with active GI bleed. No overt bowel wall   thickening.  PERITONEUM/RETROPERITONEUM: Within normal limits.  VESSELS: Atherosclerotic changes.  LYMPH NODES: No lymphadenopathy.  ABDOMINAL WALL: Within normal limits.  BONES: Degenerative changes.    IMPRESSION:    Active GI bleed in mid to distal descending colonic loop. Further GI   workup and imaging follow-up is suggested. Diverticulosis coli.    Additional findings as mentioned above.    < end of copied text >    < from: Colonoscopy (08.01.24 @ 16:00) >    Impressions:  Diverticular bleed, clip placed.  Plan:    Return to floor for further management  Advance diet as tolerated  Monitor for recurring GI bleeding  Heriberto Lynn    < end of copied text >    -------------  Assessment/Plan:     Patient is a 88M, from home ambulates independently, with PMHx of Afib (on xarelto), pre-DM, HTN, HLD, gout, CVA who presents with 1 day history of jame red bloody bowel movement. Patient was admitted on 7/30-8/2 with the same presentation s/p colonoscopy and clip placed on 8/1 on discharged back on Xarelto. CT abd/pelvis w/IV contrast findings active GIB in mid to distal descending colonic loop.   GI Dr. Mercedes following.   Advance GI consulted.     #Hematochezia  #acute anemia    Monitor CBC q6h and transfuse for Hgb <7-8 or if symptomatic.   IR consult for embolization   Consider holding xarelto if okay per primary team and or cardiology   Tentative colonoscopy tomorrow 8/6. NPO after midnight. Golytely 4 Liters , start at 6pm today. Hold DVT ppx 24 hrs prior to procedure.     This note and its recommendations herein are preliminary until such time as cosigned by an attending.

## 2024-08-05 NOTE — ED PROVIDER NOTE - CLINICAL SUMMARY MEDICAL DECISION MAKING FREE TEXT BOX
H&H is 7.4/23 which decreased from 8.5/26 on August 2.  INR elevated at 2.21.  Patient has taken Xarelto in the past 2 days.  Given history of active GI bleed and anticoagulant use  Kcentra and packed red blood cells ordered.  Case discussed with ICU attending as patient is currently even dynamically stable with normal blood pressure patient does not warrant ICU admission.  Patient's  admitting PMD Dr. Bone  and GI attending Dr. Lane  were paged. H&H is 7.4/23 which decreased from 8.5/26 on August 2.  INR elevated at 2.21.  Patient has taken Xarelto in the past 2 days.  Given history of active GI bleed and anticoagulant use  Kcentra and packed red blood cells ordered.  Case discussed with ICU attending as patient is currently even dynamically stable with normal blood pressure patient does not warrant ICU admission.  6am Patient's  admitting PMD Dr. Bone  and GI attending Dr. JASON Mercedes  were paged.    6:10a- Pt had episode of blood per rectum. PRBC to be administered. H&H is 7.4/23 which decreased from 8.5/26 on August 2.  INR elevated at 2.21.  Patient has taken Xarelto in the past 2 days.  Given history of active GI bleed and anticoagulant use  Kcentra and packed red blood cells ordered.  Case discussed with ICU attending as patient is currently even dynamically stable with normal blood pressure patient does not warrant ICU admission.  6am Patient's  admitting PMD Dr. Bone  and GI attending Dr. JASON Mercedes  were paged.    6:10a- Pt had episode of blood per rectum. PRBC to be administered.  645a- Radiologist reported = active GI bleed. Dr. Bone was informed, requested pt admitted to his service. GI Dr. Mercedes re-paged. Pt in no acute distress. H&H is 7.4/23 which decreased from 8.5/26 on August 2 .  INR elevated at 2.21.  Patient has taken Xarelto in the past 2 days.  Given history of active GI bleed and anticoagulant use,  Kcentra and packed red blood cells ordered.  Case discussed with ICU attending as patient is currently hemodynamically stable with normal blood pressure therefore patient does not warrant ICU admission.  6am Patient's  admitting PMD Dr. Bone  and GI attending Dr. JASON Mercedes  were paged.    6:10a- Pt had episode of blood per rectum. PRBC to be administered.  645a- Radiologist reported + active lower GI bleed. Dr. Bone was informed, requested pt admitted to his service. GI Dr. Mercedes re-paged. Pt in no acute distress.  745a- Dr. JASON Mercedes endorsed.

## 2024-08-05 NOTE — CONSULT NOTE ADULT - RESPIRATORY
no wheezes/no rales/no rhonchi/no respiratory distress/no use of accessory muscles/airway patent/breath sounds equal/good air movement no wheezes/no rales/airway patent/breath sounds equal/rhonchi

## 2024-08-05 NOTE — CONSULT NOTE ADULT - ATTENDING COMMENTS
88M with PMHx of Afib (on xarelto), pre-DM, HTN, HLD, gout, CVA, BPH who presents with 1 day history of BRBPR. Admitted to Medicine, with continued episodes of BRBPR in the morning. s/p 2uPRBC with minimal Hgb improvement. Pt remained hemodynamically stable. CTA A/P Active GI bleed in mid to distal descending colonic loop. IR Consulted for possible angioplasty. Pt admitted to ICU for active GIB.    Plan:  #Acute blood loss anemia   #Active Lower GI bleeding - likely diverticular   #HTN  #HLD  #Afib on Xarelto and Metoprolol  #Bradycardia  #BPH  #Gout    Plan:  - Hold all AC, s/p kcentra overnight  - Transfuse 2 units PRBC, FFP and 1 platelets  - Monitor for further bleeding  - Trend hgb   - NPO for now  - GI and IR consulted  - IR recommending correcting coagulopathy and observing at this time prior to further interventions  - Discussed with GI NP, plan for possible colonoscopy tomorrow   - General surgery consult  - Hold all antihypertensives and rate control medications  - Close hemodynamic monitoring   - Non chemical DVT prophylaxis  - Discussed with daughters and son at bedside via language line

## 2024-08-05 NOTE — H&P ADULT - NSHPREVIEWOFSYSTEMS_GEN_ALL_CORE
CONSTITUTIONAL: +fatigue; No fever, weight loss  EYES: No eye pain, visual disturbances, or discharge  ENT:  No difficulty hearing, tinnitus, vertigo; No sinus or throat pain  NECK: No pain or stiffness  RESPIRATORY: +SOB; No cough, wheezing, chills or hemoptysis;   CARDIOVASCULAR: +dizziness; No chest pain, palpitations, passing or leg swelling  GASTROINTESTINAL: +hematochezia; No abdominal or epigastric pain. No nausea, vomiting, or hematemesis; No diarrhea or constipation. No melena.   GENITOURINARY: No dysuria, frequency, hematuria, or incontinence  NEUROLOGICAL: No headaches, memory loss, loss of strength, numbness, or tremors  SKIN: No itching, burning, rashes, or lesions   LYMPH Nodes: No enlarged glands  MUSCULOSKELETAL: No joint pain or swelling; No muscle, back, or extremity pain  PSYCHIATRIC: No depression, anxiety, mood swings, or difficulty sleeping  HEME/LYMPH: No easy bruising, or bleeding gums  ALLERGY AND IMMUNOLOGIC: No hives or eczema

## 2024-08-05 NOTE — ED PROVIDER NOTE - SECONDARY DIAGNOSIS.
- Initiated goals of care discussion with patient's brother-in-law (listed in chart as primary point of contact); patient is full code at this time; goal is as much recovery as possible  -The patient's wife is in [place], but the patient's brother-in-law has been visiting and placing the patient's wife on facetime; she is aware of all that is happening and has happened Anemia due to acute blood loss

## 2024-08-05 NOTE — ED PROVIDER NOTE - OBJECTIVE STATEMENT
86 yo M past medical history significant for hypertension, hyperlipidemia, CVA. Patient was admitted on July 30 for lower GI bleed and discharged on August 2.  As per daughter she was instructed to continue with all his home medications including Xarelto which patient has received  for past 2 days.  Daughter states at 5 PM yesterday patient had multiple episodes of lower GI bleed described as blood per rectum.  On evaluation patient is well-appearing, no acute distress and denies any current abdominal pain, no chest pain, no shortness of breath.

## 2024-08-05 NOTE — ED PROVIDER NOTE - NS ED MD DISPO ADMITTING SERVICE
-- Message is from the [facility]--    Reason for Call: Patient is calling and he would like Dr. Randall to send a script to [facility]  for a Free Style 14 day Blood Jacqui. Please contact the patient once this order has been made.     Caller Information       Type Contact Phone    07/01/2019 04:49 PM Phone (Incoming) Matt Rodriguez (Self) [phone] (H)          Alternative phone number: n/a    Turnaround time given to caller:   \"This message will be sent to [state Provider's name]. The clinical team will fulfill your request as soon as they review your message.\"     MEDICINE

## 2024-08-05 NOTE — ACUTE INTERFACILITY TRANSFER NOTE - HOSPITAL COURSE
88M PMH HTN, HLD, CVA on Xarelto, diverticulosis, presents with diverticular bleed. Of note, patient admitted at [facility] 7/30-8/2 for diverticular bleed, at which time underwent colonoscopy with clip placement. Patient was discharged and Xarelto restarted. CTA on this admission demonstrating intraluminal extravasation of contrast identified in mid to lower descending colonic loop, compatible with active GI bleed.    Patient is currently hemodynamically stable. However, patient is still actively bleeding and does not have appropriate response to the transfusions. Spoke with GI, Surgery, and IR, and it is recommended that patient should be transferred to a facility where IR is available in the case that patient decompensates requiring further intervention. 88M PMH HTN, HLD, CVA on Xarelto, diverticulosis, presents with diverticular bleed. Of note, patient admitted at [facility] 7/30-8/2 for diverticular bleed, at which time underwent colonoscopy with clip placement. Patient was discharged and Xarelto restarted. CTA on this admission demonstrating intraluminal extravasation of contrast identified in mid to lower descending colonic loop, compatible with active GI bleed.    Patient is currently hemodynamically stable. However, patient is still actively bleeding and does not have appropriate response to the transfusions. Patient reportedly last took his xarelto last night (8/4) and was reversed this AM in the ED, but given MILE, there is concern for delayed clearance of the patient's DOAC. Spoke with GI, Surgery, and IR, and it is recommended that patient should be transferred to a facility where IR is available in the case that patient decompensates requiring further intervention.  Discussed case with Dr Diego, Interventional Radiology (on call at [facility]) regarding transfer. 88M PMH HTN, HLD, CVA on Xarelto, diverticulosis, presents with diverticular bleed. Of note, patient admitted at [facility] 7/30-8/2 for diverticular bleed, at which time underwent colonoscopy with clip placement. Patient was discharged and Xarelto restarted. CTA on this admission demonstrating intraluminal extravasation of contrast identified in mid to lower descending colonic loop, compatible with active GI bleed.    Patient is currently hemodynamically stable. However, patient is still actively bleeding and does not have appropriate response to the transfusions. Patient reportedly last took his xarelto last night (8/4) and was reversed this AM in the ED, but given MILE, there is concern for delayed clearance of the patient's DOAC. Spoke with GI, Surgery, and IR, and it is recommended that patient should be transferred to a facility where IR is available in the case that patient decompensates requiring further intervention.  Discussed case with Dr Diego, Interventional Radiology (on call at [facility]) regarding transfer.  88M PMH HTN, HLD, CVA on Xarelto, diverticulosis, presents with diverticular bleed. Of note, patient admitted at [facility] 7/30-8/2 for diverticular bleed, at which time underwent colonoscopy with clip placement. Patient was discharged and Xarelto restarted. CTA on this admission demonstrating intraluminal extravasation of contrast identified in mid to lower descending colonic loop, compatible with active GI bleed.    Patient is currently hemodynamically stable. However, patient is still actively bleeding and does not have appropriate response to the transfusions. Spoke with GI, Surgery, and IR, and it is recommended that patient should be transferred to a facility where IR is available in the case that patient decompensates requiring further intervention.    Of note, patient with slow Afib. May consider EP eval for possible pacer if does not resolve with beta blocker.   88M PMH HTN, HLD, CVA on Xarelto, diverticulosis, presents with diverticular bleed. Of note, patient admitted at [facility] 7/30-8/2 for diverticular bleed, at which time underwent colonoscopy with clip placement. Patient was discharged and Xarelto restarted. CTA on this admission demonstrating intraluminal extravasation of contrast identified in mid to lower descending colonic loop, compatible with active GI bleed.    Patient is currently hemodynamically stable. However, patient is still actively bleeding and does not have appropriate response to the transfusions. Patient reportedly last took his xarelto last night (8/4) and was reversed this AM in the ED, but given MILE, there is concern for delayed clearance of the patient's DOAC. Spoke with GI, Surgery, and IR, and it is recommended that patient should be transferred to a facility where IR is available in the case that patient decompensates requiring further intervention.  Discussed case with Dr Diego, Interventional Radiology (on call at [facility]) regarding transfer.    Of note, patient with slow Afib. May consider EP eval for possible pacer if does not resolve with beta blocker.

## 2024-08-05 NOTE — H&P ADULT - PROBLEM SELECTOR PLAN 6
Hx of pre-DM   A1C (9/1) 6.6   Given age, no need for tight control. consider carb limited diet when resuming diet

## 2024-08-05 NOTE — PATIENT PROFILE ADULT - ABILITY TO HEAR (WITH HEARING AID OR HEARING APPLIANCE IF NORMALLY USED):
n/a
Mildly to Moderately Impaired: difficulty hearing in some environments or speaker may need to increase volume or speak distinctly

## 2024-08-05 NOTE — CONSULT NOTE ADULT - ASSESSMENT
1. Rectal bleeding  2. Recurrent diverticular bleeding  3. Anemia  4. Afib    Suggestions:    1. Monitor H/H  2. Transfuse PRBC as needed  3. Protonix 40mg daily  4. Avoid NSAID  5. Hold Xarelto  6. Surgical evaluation  7. Consider IR evaluation for embolization if actively bleeding  8. Clear liquid diet  9. Monitor electrolytes  10. DVT prophylaxis

## 2024-08-05 NOTE — CONSULT NOTE ADULT - REASON FOR ADMISSION
Diverticular bleeding

## 2024-08-05 NOTE — CONSULT NOTE ADULT - NS ATTEND AMEND GEN_ALL_CORE FT
88 M h/o AF/Xarelto, tic bleed 8/1 s/p clip Dr Leah kaminski.   Re-bleed, 4 U PRBC transfused, CTA + L colon bleed  IR eval - no embo  Extensive conversation with both grandaughters - why not IR? when surgery? etc  Pt lethargic, slept through whole convo, also mare to 40s and antriorly ronchorous  Explained at length doubt would be able to re-prep, doubt tic can be located/clipped effectively--IR is first line for CTA visible LGIB per guidelines.   Also explained that tic bleeds tend to stop on their own and that they are rarely lifethreatening   explained AF/clot risk is <1% per day A/C held, so that even though Xarelto is held pt isn't in imminent CVA risk - advised eval for Watchman as outpt if possible

## 2024-08-05 NOTE — CONSULT NOTE ADULT - SUBJECTIVE AND OBJECTIVE BOX
Patient is a 88M, from home ambulates independently, with PMHx of Afib (on xarelto), pre-DM, HTN, HLD, gout, CVA, BPH who presents with 1 day history of BRBPR. Patient was admitted on - with the same presentation s/p colonoscopy and clip placed on  on discharged back on Xarelto. Patient was in his usual state and since last night, patient has been having 2-3 painless jame bloody bowel movements with blood clots every hour. Patient endorses lightheadedness and shortness of breath after these bowel movements. Denies headache, palpitation chest pain, abdominal pain, dysuria.    Patient noted to have 3 more jame bloody BM in the ED. Last dose Xarelto PM (). S/p Kaycentra in ED    Interval HPI: Pt transferred to TELE floor and noted to have 2 more episodes of BRBPR (clots, no stool) with minimal improvement in Hgb after 2uPRBC. Pt remained hemodynamically stable however CT shows active GIB. Pt denies any dizziness, CP, but states he has occasional SOB. ICU consulted for active GIB.       PAST MEDICAL & SURGICAL HISTORY:  Gout      Hypertension      Hyperlipidemia      CVA (cerebrovascular accident)          SOCIAL HX:   Smoking   Denies                       ETOH         Denies                 FAMILY HISTORY:  :  No known cardiovacular family hisotry     ROS:  See HPI     Allergies    No Known Allergies    Intolerances          PHYSICAL EXAM    ICU Vital Signs Last 24 Hrs  T(C): 36.7 (05 Aug 2024 13:46), Max: 36.7 (05 Aug 2024 13:46)  T(F): 98.1 (05 Aug 2024 13:46), Max: 98.1 (05 Aug 2024 13:46)  HR: 48 (05 Aug 2024 15:05) (47 - 62)  BP: 129/65 (05 Aug 2024 15:05) (120/74 - 149/62)  BP(mean): --  ABP: --  ABP(mean): --  RR: 18 (05 Aug 2024 15:05) (15 - 20)  SpO2: 95% (05 Aug 2024 15:05) (95% - 99%)    O2 Parameters below as of 05 Aug 2024 15:05  Patient On (Oxygen Delivery Method): room air                  GENERAL: NAD, pale appearing, obese male, laying in bed  HEAD:  Atraumatic, Normocephalic  EYES: EOMI, PERRLA, conjunctiva and sclera clear  NECK: Supple  CHEST/LUNG: Clear to auscultation bilaterally, no RRW  HEART: Regular rate and rhythm; No murmurs, rubs, or gallops  ABDOMEN: Soft, Nontender, Nondistended; Bowel sounds present, scant blood on glove with rectal no masses.  EXTREMITIES:  2+ Peripheral Pulses, No edema  PSYCH: AAOx3  NEUROLOGY: non-focal  SKIN: No rashes or lesions         LABS:                          7.7    6.84  )-----------( 149      ( 05 Aug 2024 13:29 )             23.6                                               08-05    139  |  108  |  14  ----------------------------<  109<H>  3.8   |  26  |  1.53<H>    Ca    8.3<L>      05 Aug 2024 04:36    TPro  6.0  /  Alb  3.1<L>  /  TBili  1.1  /  DBili  x   /  AST  54<H>  /  ALT  44  /  AlkPhos  99  08-05      PT/INR - ( 05 Aug 2024 04:36 )   PT: 24.6 sec;   INR: 2.21 ratio         PTT - ( 05 Aug 2024 04:36 )  PTT:32.8 sec                                       Urinalysis Basic - ( 05 Aug 2024 06:12 )    Color: Yellow / Appearance: Clear / S.022 / pH: x  Gluc: x / Ketone: Negative mg/dL  / Bili: Negative / Urobili: 0.2 mg/dL   Blood: x / Protein: Negative mg/dL / Nitrite: Negative   Leuk Esterase: Negative / RBC: x / WBC x   Sq Epi: x / Non Sq Epi: x / Bacteria: x                                            LIVER FUNCTIONS - ( 05 Aug 2024 04:36 )  Alb: 3.1 g/dL / Pro: 6.0 g/dL / ALK PHOS: 99 U/L / ALT: 44 U/L DA / AST: 54 U/L / GGT: x

## 2024-08-05 NOTE — CONSULT NOTE ADULT - NOSE
Patient arrives via triage with complaints of hives and possible throat swelling that occurred 2 hours PTA.  Patient took benadryl at 5 pm, fell asleep and when she woke up her throat felt tight and could not swallow anything.   
no discharge/no deviation

## 2024-08-05 NOTE — ED ADULT NURSE NOTE - ED STAT RN HANDOFF DETAILS
Attempted report, writer notified that nurse is currently doing rounds, will receive call back in 10 minutes

## 2024-08-05 NOTE — CONSULT NOTE ADULT - ASSESSMENT
Assessment: 88M with PMHx of Afib (on xarelto), pre-DM, HTN, HLD, gout, CVA, BPH who presents with 1 day history of BRBPR.   Pt was perviously admitted to [facility] last week with gib and clip placed in sigmoid colon for control ob bleed.  Pt was stable however rebleed with restarting his anticoagulation.  Now Admitted to Medicine service, with continued episodes of BRBPR this morning. s/p 2uPRBC with minimal Hgb improvement. Pt remained hemodynamically stable. CTA A/P Active GI bleed in mid to distal descending colonic loop. IR Consulted for possible angioplasty.   Pt admitted to ICU for active GIB monoitoring, transfusion as needed  Observation, no emergent surgical interventon warranted presently.  Consider transfer to [facility] where emergent IR procedure can be available if needed promptly        #Lower GIB  #HTN  #HLD  #Afib on Xarelto and Metoprolol  #Bradycardia  #BPH  #Gout  CTA A/P (8/5): Active GI bleed in mid to distal descending colonic loop.  -  IR , states can not perform procedure today 8/5  - Continue transfusing PRBC while actively bleeding and Hgb < 8 or if Hgb <7  IR consulted for possible embolization  GI Dr. Mercedes and Dr. Jansen Following

## 2024-08-05 NOTE — PATIENT PROFILE ADULT - FALL HARM RISK - HARM RISK INTERVENTIONS

## 2024-08-05 NOTE — CONSULT NOTE ADULT - ASSESSMENT
Assessment: 88M with PMHx of Afib (on xarelto), pre-DM, HTN, HLD, gout, CVA, BPH who presents with 1 day history of BRBPR. Admitted to Medicine, with continued episodes of BRBPR in the morning. s/p 2uPRBC with minimal Hgb improvement. Pt remained hemodynamically stable. CTA A/P Active GI bleed in mid to distal descending colonic loop. IR Consulted for possible angioplasty. Pt admitted to ICU for active GIB.    Plan:  #Active Lower GIB  #HTN  #HLD  #Afib on Xarelto and Metoprolol  #Bradycardia  #BPH  #Gout    Neuro:  #AOx3, currently at baseline    Cardiovascular:  #HTN  Pt has a history of HTN, takes Metoprolol Tartrate 50mg BID at home  last dose 8/4 PM  - Hold in setting of GIB and bradycardia    #HLD  Pt has a history of HLD, takes atorvastatin 20mg qhs at home  - C/w atorvastatin 20mg qhs    Pulmonary:   #Atelectasis  CXR elevated R-hemidiaphragm and compressive atelectasias  - C/w Incentive spirometry    Infectious Diseases:  #No active issues    Gastrointestinal:  #Active Lower GIB  CTA A/P (8/5): Active GI bleed in mid to distal descending colonic loop.  Kaycentra given AM 8/5 for Xarelto reversal  Hgb 7.4 > 2uPRBC (8/5) > 7.7 > 2uPRBC, 2uFFP, 1uPlt (8/5) >   - F/u Post transfusion CBC, INR, BMP  IR consulted for possible embolization  GI Dr. Mercedes and Dr. Jansen Following    Renal:  #MILE  Cr 1.53 on admission, baseline 1.15 Aug 1st 2024  Likely pre-renal vs ATN in setting of active GIB  s/p 1LLR in ED  - Avoid Nephrotoxic agents (NSAIDs, ACEi, ARBs)  - Monitor BMP daily     Heme/onc:   #Anemia 2/2 Active blood loss  see GI  Maintain Active type and screen  transfuse if symptomatic and Hgb < 8 or transfuse if Hgb < 7    #thrombocytompenia  Plt 197 > 149 1uPlt  Endo:     Skin/ catheter:     Prophylaxis:     Goals of Care:     Dispo:  Assessment: 88M with PMHx of Afib (on xarelto), pre-DM, HTN, HLD, gout, CVA, BPH who presents with 1 day history of BRBPR. Admitted to Medicine, with continued episodes of BRBPR in the morning. s/p 2uPRBC with minimal Hgb improvement. Pt remained hemodynamically stable. CTA A/P Active GI bleed in mid to distal descending colonic loop. IR Consulted for possible angioplasty. Pt admitted to ICU for active GIB.    Plan:  #Active Lower GIB  #HTN  #HLD  #Afib on Xarelto and Metoprolol  #Bradycardia  #BPH  #Gout    Neuro:  #AOx3, currently at baseline    Cardiovascular:  #HTN  Pt has a history of HTN, takes Metoprolol Tartrate 50mg BID at home  last dose 8/4 PM  - Hold in setting of GIB and bradycardia    #HLD  Pt has a history of HLD, takes atorvastatin 20mg qhs at home  - C/w atorvastatin 20mg qhs    Pulmonary:   #Atelectasis  CXR elevated R-hemidiaphragm and compressive atelectasias  - C/w Incentive spirometry    Infectious Diseases:  #No active issues    Gastrointestinal:  #Active Lower GIB  CTA A/P (8/5): Active GI bleed in mid to distal descending colonic loop.  Kaycentra given AM 8/5 for Xarelto reversal  Hemodynamically stable  Hgb 7.4 > 2uPRBC (8/5) > 7.7 > 2uPRBC, 2uFFP, 1uPlt (8/5) >   GI recs IR consult for embolization  - F/u Post transfusion CBC, INR, BMP  - F/u w/ IR , states can not perform procedure today 8/5  - F/u Sx Recs  - Continue transfusing PRBC while actively bleeding and Hgb < 8 or if Hgb <7  IR consulted for possible embolization  GI Dr. Mercedes and Dr. Jansen Following  Surgery Consulted    Renal:  #MILE  Cr 1.53 on admission, baseline 1.15 Aug 1st 2024  Likely pre-renal vs ATN in setting of active GIB  s/p 1LLR in ED  - Avoid Nephrotoxic agents (NSAIDs, ACEi, ARBs)  - Monitor BMP daily     Heme/onc:   #Anemia 2/2 Active blood loss  see GI  Maintain Active type and screen  transfuse if symptomatic and Hgb < 8 or transfuse if Hgb < 7    #thrombocytopenia  Likely in setting of active bleeding  Plt 197 > 149 > 1uPlt (8/5) >   -F/u CBC post transfusion    Endo:   #no active issues    Skin/ catheter:   2x 18g peripheral lines in ACs B/L    Prophylaxis:   SCDs  PPI    Goals of Care:   Full Code    Dispo: Transfer to ICU

## 2024-08-05 NOTE — H&P ADULT - NSHPPHYSICALEXAM_GEN_ALL_CORE
Vital Signs Last 24 Hrs  T(C): 36.4 (05 Aug 2024 07:00), Max: 36.4 (05 Aug 2024 02:34)  T(F): 97.6 (05 Aug 2024 07:00), Max: 97.6 (05 Aug 2024 02:34)  HR: 51 (05 Aug 2024 07:23) (47 - 56)  BP: 135/62 (05 Aug 2024 07:23) (120/74 - 148/85)  BP(mean): --  RR: 15 (05 Aug 2024 07:23) (15 - 20)  SpO2: 98% (05 Aug 2024 07:23) (98% - 99%)    Parameters below as of 05 Aug 2024 07:23  Patient On (Oxygen Delivery Method): room air    GENERAL: NAD, laying comfortably in bed   HEAD:  Atraumatic, Normocephalic  EYES: EOMI, PERRLA, conjunctiva and sclera clear  ENMT: No tonsillar erythema, exudates, or enlargement; Moist mucous membranes, No lesions  NECK: Supple, normal appearance, No JVD; Normal thyroid; Trachea midline  NERVOUS SYSTEM:  Alert & Oriented X3,  Motor Strength 5/5 B/L upper and lower extremities, sensation intact, CN II-XII intact.   CHEST/LUNG: Lungs clear to auscultation bilaterally, No rales, rhonchi, wheezing   HEART: Regular rate and irregular rhythm; No murmurs, rubs, or gallops  ABDOMEN: Soft, Nontender, Nondistended; Bowel sounds present. HAY neg but had another jame bloody BM   : No suprapubic tenderness, CVAT;  EXTREMITIES:  2+ Peripheral Pulses, 2+ B/L LE edema (L>R); No clubbing, cyanosis  LYMPH: No lymphadenopathy noted  SKIN: No rashes or lesions;  Good capillary refill

## 2024-08-05 NOTE — CONSULT NOTE ADULT - SUBJECTIVE AND OBJECTIVE BOX
Interventional Radiology    HPI:  Patient is a 88M, from home ambulates independently, with PMHx of Afib (on xarelto), pre-DM, HTN, HLD, gout, CVA who presents with 1 day history of jame red bloody bowel movement. Patient was admitted on 7/30-8/2 with the same presentation s/p colonoscopy and clip placed on 8/1 on discharged back on Xarelto. Patient was in his usual state and since last night, patient has been having 2-3 painless jame bloody bowel movements with blood clots every hour. Patient endorses lightheadedness and shortness of breath after these bowel movements. Denies headache, palpitation chest pain, abdominal pain, dysuria.    Patient noted to have 3 more jame bloody BM in the ED.  (05 Aug 2024 09:41)    PMHx  Gout  Hypertension  Hyperlipidemia  CVA (cerebrovascular accident)  Diverticulosis    Allergies  No Known Allergies    Medications  prothrombin complex concentrate IVPB (KCENTRA), STAT    PHYSICAL EXAM:  T(C): 36.3, Max: 36.7 (08-05-24 @ 13:46)  HR: 52  BP: 123/53  RR: 18  SpO2: 100%    LABS:  WBC 6.91 / HgB 8.8 / Hct 26.2 / Plt 130  Na -- / K -- / CO2 -- / Cl -- / BUN -- / Cr -- / Glucose --  ALT -- / -- / Alk Phos -- / TBili --  PTT -- / PT 13.4 / INR 1.18    Radiology:   < from: CT Abdomen and Pelvis w/ IV Cont (08.05.24 @ 06:10) >  IMPRESSION:    Active GI bleed in mid to distal descending colonic loop. Further GI   workup and imaging follow-up is suggested. Diverticulosis coli.    Additional findings as mentioned above.    These critical results were discussed via telephone at 8/5/2024 6:37 AM   by Dr. Euceda of radiology with Dr. Fay.    --- End of Report ---    < end of copied text >      A/P: 88M with PMH significant for HTN, HLD, CVA on Xarelto, and diverticulosis, who p/w diverticular bleed. Patient was here last week with diverticular bleed, remained HD stable, s/p reversal of INR and colonoscopy with clip placement. Patient was discharged and Xarelto restarted. Patient presents again today with LGIB, provoked by Xarelto and elevated INR. CTA demonstrating contrast extravasation from another diverticulum.     At the time of consult, patient is HD stable, on a regular floor, s/p 2 units PRBCs and Kcentra.     Per American College of Gastroenterology and SIR guidelines, first line therapy in a hemodynamically stable patient with active bleeding is rapid prep and colonoscopy. (Am J Gastroenterol. 2016 Apr; 111(4): 459-474). In light of published guidelines, will hold off on intervention unless patient develops hypotension (MAP<65 mmHg).     Recommend 2 large bore peripheral IVs, type/cross RBCs, serial CBCs and admission to ICU if needed.     Continue with aggressive resuscitation and transfuse PRBCs, platelets and factors (2:1:1).     Recommend holding Xarelto and reversing coagulopathy.   Most LGIBs will respond to appropriate medical/endoscopic therapy, as well as reversal of anticoagulation.     Please call IR if patient not responsive to conservative/endoscopic measures, if clinical situation changes and/or new information becomes available.\    Case d/w primary team and with Dr. Silva.

## 2024-08-05 NOTE — ED ADULT NURSE NOTE - NSFALLHARMRISKINTERV_ED_ALL_ED

## 2024-08-05 NOTE — ACUTE INTERFACILITY TRANSFER NOTE - PLAN OF CARE
88M PMH HTN, HLD, CVA on Xarelto, diverticulosis, presents with diverticular bleed. Of note, patient admitted at [facility] 7/30-8/2 for diverticular bleed, at which time underwent colonoscopy with clip placement. Patient was discharged and Xarelto restarted. CTA on this admission demonstrating intraluminal extravasation of contrast identified in mid to lower descending colonic loop, compatible with active GI bleed.    Patient is currently hemodynamically stable. However, patient is still actively bleeding and does not have appropriate response to the transfusions. Spoke with GI, Surgery, and IR, and it is recommended that patient should be transferred to a facility where IR is available in the case that patient decompensates requiring further intervention. 88M PMH HTN, HLD, CVA on Xarelto, diverticulosis, presents with diverticular bleed. Of note, patient admitted at [facility] 7/30-8/2 for diverticular bleed, at which time underwent colonoscopy with clip placement. Patient was discharged and Xarelto restarted. CTA on this admission demonstrating intraluminal extravasation of contrast identified in mid to lower descending colonic loop, compatible with active GI bleed.    Patient is currently hemodynamically stable. However, patient is still actively bleeding and does not have appropriate response to the transfusions. Spoke with GI, Surgery, and IR, and it is recommended that patient should be transferred to a facility where IR is available in the case that patient decompensates requiring further intervention.    Of note, patient with slow Afib. May consider EP eval for possible pacer if does not resolved with beta blocker. 88M PMH HTN, HLD, CVA on Xarelto, diverticulosis, presents with diverticular bleed. Of note, patient admitted at [facility] 7/30-8/2 for diverticular bleed, at which time underwent colonoscopy with clip placement. Patient was discharged and Xarelto restarted. CTA on this admission demonstrating intraluminal extravasation of contrast identified in mid to lower descending colonic loop, compatible with active GI bleed.    Patient is currently hemodynamically stable. However, patient is still actively bleeding and does not have appropriate response to the transfusions. Patient reportedly last took his xarelto last night (8/4) and was reversed this AM in the ED, but given MILE, there is concern for delayed clearance of the patient's DOAC. Spoke with GI, Surgery, and IR, and it is recommended that patient should be transferred to a facility where IR is available in the case that patient decompensates requiring further intervention.  Discussed case with Dr Diego, Interventional Radiology (on call at [facility]) regarding transfer.  88M PMH HTN, HLD, CVA on Xarelto, diverticulosis, presents with diverticular bleed. Of note, patient admitted at [facility] 7/30-8/2 for diverticular bleed, at which time underwent colonoscopy with clip placement. Patient was discharged and Xarelto restarted. CTA on this admission demonstrating intraluminal extravasation of contrast identified in mid to lower descending colonic loop, compatible with active GI bleed.    Patient is currently hemodynamically stable. However, patient is still actively bleeding and does not have appropriate response to the transfusions. Spoke with GI, Surgery, and IR, and it is recommended that patient should be transferred to a facility where IR is available in the case that patient decompensates requiring further intervention.    Of note, patient with slow Afib. May consider EP eval for possible pacer if does not resolve with beta blocker. 88M PMH HTN, HLD, CVA on Xarelto, diverticulosis, presents with diverticular bleed. Of note, patient admitted at [facility] 7/30-8/2 for diverticular bleed, at which time underwent colonoscopy with clip placement. Patient was discharged and Xarelto restarted. CTA on this admission demonstrating intraluminal extravasation of contrast identified in mid to lower descending colonic loop, compatible with active GI bleed.    Patient is currently hemodynamically stable. However, patient is still actively bleeding and does not have appropriate response to the transfusions. Patient reportedly last took his xarelto last night (8/4) and was reversed this AM in the ED, but given MILE, there is concern for delayed clearance of the patient's DOAC. Spoke with GI, Surgery, and IR, and it is recommended that patient should be transferred to a facility where IR is available in the case that patient decompensates requiring further intervention.  Discussed case with Dr Diego, Interventional Radiology (on call at [facility]) regarding transfer.    Of note, patient with slow Afib. May consider EP eval for possible pacer if does not resolve with beta blocker.

## 2024-08-05 NOTE — H&P ADULT - PROBLEM SELECTOR PLAN 2
Hx of Afib on xarelto 15mg qd and metoprolol 50mg qd   EKG: Atrial fibrillation  Holding xarelto in the setting of acute GI bleed, holding metoprolol in the setting of slow ventricular response   Cardio Dr. Chun consulted Hx of Afib on xarelto 15mg qd and metoprolol 50mg qd   EKG: Atrial fibrillation with slow ventricular response   Holding xarelto in the setting of acute GI bleed,   holding metoprolol in the setting of bradycardia/slow ventricular response  Cardio Dr. Chun consulted

## 2024-08-05 NOTE — ED ADULT NURSE REASSESSMENT NOTE - NS ED NURSE REASSESS COMMENT FT1
Pt lying in stretcher, respirations even and unlabored/ Pt seen talking to daughter at bedside/ Pt AXO X3, Bengali speaking/ Pt given bedpan for bowel movement. Ongoing blood transfusion, No apparent distress noted.
blood per rectum, BM x 3 in the ER. Blood in progress at this time.

## 2024-08-05 NOTE — H&P ADULT - ASSESSMENT
Patient is a 88M, from home ambulates independently, with PMHx of Afib (on xarelto), pre-DM, HTN, HLD, gout, CVA who presents with 1 day history of jame red bloody bowel movement. In ED had 2-3 jame bloody BM. Hgb 8.5 > 7.4. CT A/P: Active GI bleed in mid to distal descending colonic loop. Diverticulosis coli. Admitted for GI bleed requiring transfusions.

## 2024-08-05 NOTE — CONSULT NOTE ADULT - SUBJECTIVE AND OBJECTIVE BOX
Patient is a 88y old  Male who presents with a chief complaint of Diverticular bleeding (05 Aug 2024 13:18)      HPI:  Patient is a 88M, from home ambulates independently, with PMHx of Afib (on xarelto), pre-DM, HTN, HLD, gout, CVA, BPH who presents with 1 day history of BRBPR. Patient was admitted on - with the same presentation s/p colonoscopy and clip placed on  on discharged back on Xarelto. Patient was in his usual state and since last night, patient has been having 2-3 painless jame bloody bowel movements with blood clots every hour. Patient endorses lightheadedness and shortness of breath after these bowel movements. Denies headache, palpitation chest pain, abdominal pain, dysuria.    Patient noted to have 3 more jame bloody BM in the ED. Last dose Xarelto PM (). S/p Kaycentra in ED    Interval HPI: Pt transferred to TELE floor and noted to have 2 more episodes of BRBPR (clots, no stool) with minimal improvement in Hgb after 2uPRBC. Pt remained hemodynamically stable however CT shows active GIB. Pt denies any dizziness, CP, but states he has occasional SOB. ICU consulted for active GIB.       PAST MEDICAL & SURGICAL HISTORY:  Gout      Hypertension      Hyperlipidemia      CVA (cerebrovascular accident)          SOCIAL HX:   Smoking   Denies                       ETOH         Denies                 FAMILY HISTORY:  :  No known cardiovacular family hisotry     ROS:  See HPI     Allergies    No Known Allergies    Intolerances          PHYSICAL EXAM    ICU Vital Signs Last 24 Hrs  T(C): 36.7 (05 Aug 2024 13:46), Max: 36.7 (05 Aug 2024 13:46)  T(F): 98.1 (05 Aug 2024 13:46), Max: 98.1 (05 Aug 2024 13:46)  HR: 62 (05 Aug 2024 13:46) (47 - 62)  BP: 149/62 (05 Aug 2024 13:46) (120/74 - 149/62)  BP(mean): --  ABP: --  ABP(mean): --  RR: 19 (05 Aug 2024 13:46) (15 - 20)  SpO2: 98% (05 Aug 2024 13:46) (98% - 99%)    O2 Parameters below as of 05 Aug 2024 13:46  Patient On (Oxygen Delivery Method): room air            GENERAL: NAD, pale appearing, obese male, laying in bed  HEAD:  Atraumatic, Normocephalic  EYES: EOMI, PERRLA, conjunctiva and sclera clear  NECK: Supple  CHEST/LUNG: Clear to auscultation bilaterally, no RRW  HEART: Regular rate and rhythm; No murmurs, rubs, or gallops  ABDOMEN: Soft, Nontender, Nondistended; Bowel sounds present  EXTREMITIES:  2+ Peripheral Pulses, No edema  PSYCH: AAOx3  NEUROLOGY: non-focal  SKIN: No rashes or lesions         LABS:                          7.7    6.84  )-----------( 149      ( 05 Aug 2024 13:29 )             23.6                                               08-05    139  |  108  |  14  ----------------------------<  109<H>  3.8   |  26  |  1.53<H>    Ca    8.3<L>      05 Aug 2024 04:36    TPro  6.0  /  Alb  3.1<L>  /  TBili  1.1  /  DBili  x   /  AST  54<H>  /  ALT  44  /  AlkPhos  99  08-05      PT/INR - ( 05 Aug 2024 04:36 )   PT: 24.6 sec;   INR: 2.21 ratio         PTT - ( 05 Aug 2024 04:36 )  PTT:32.8 sec                                       Urinalysis Basic - ( 05 Aug 2024 06:12 )    Color: Yellow / Appearance: Clear / S.022 / pH: x  Gluc: x / Ketone: Negative mg/dL  / Bili: Negative / Urobili: 0.2 mg/dL   Blood: x / Protein: Negative mg/dL / Nitrite: Negative   Leuk Esterase: Negative / RBC: x / WBC x   Sq Epi: x / Non Sq Epi: x / Bacteria: x                                                  LIVER FUNCTIONS - ( 05 Aug 2024 04:36 )  Alb: 3.1 g/dL / Pro: 6.0 g/dL / ALK PHOS: 99 U/L / ALT: 44 U/L DA / AST: 54 U/L / GGT: x                                                  Urinalysis with Rflx Culture (collected 05 Aug 2024 06:12)                                                                                           CXR: Clear, atelectasis and elevated R- Lon- diaphragm    ECHO: NA    MEDICATIONS  (STANDING):  atorvastatin 20 milliGRAM(s) Oral at bedtime  pantoprazole    Tablet 40 milliGRAM(s) Oral before breakfast  tamsulosin 0.4 milliGRAM(s) Oral at bedtime    MEDICATIONS  (PRN):  ondansetron Injectable 4 milliGRAM(s) IV Push every 8 hours PRN Nausea and/or Vomiting

## 2024-08-05 NOTE — H&P ADULT - PROBLEM SELECTOR PLAN 1
p/w multiple painless jame blood bowel movements   s/p colonoscopy and clip on 8/1: Excavated lesions. Multiple diverticula were seen in the left side of the   colon. One hemostatic clip placed on distal descending colon.   Hgb 8.5 > 7.4   CT A/P: Active GI bleed in mid to distal descending colonic loop  s/p 2U pRBC in ED  Holding Xarelto  NPO for now   GI Dr. Mercedes consulted   c/w PPI qd p/w multiple painless jame blood bowel movements   s/p colonoscopy and clip on 8/1: Excavated lesions. Multiple diverticula were seen in the left side of the   colon. One hemostatic clip placed on distal descending colon.   Hgb 8.5 > 7.4   CT A/P: Active GI bleed in mid to distal descending colonic loop  s/p 2U pRBC in ED, Kcentra in ED   Holding Xarelto  NPO for now   GI Dr. Mercedes consulted   c/w PPI qd

## 2024-08-05 NOTE — H&P ADULT - HISTORY OF PRESENT ILLNESS
Patient is a 88M, from home ambulates independently, with PMHx of Afib (on xarelto), DM, HTN who presents with 1 day history of jame red bloody bowel movement.  Patient is a 88M, from home ambulates independently, with PMHx of Afib (on xarelto), pre-DM, HTN, HLD, gout, CVA who presents with 1 day history of jame red bloody bowel movement. Patient was admitted on 7/30-8/2 with the same presentation s/p colonoscopy and clip placed on 8/1 on discharged back on Xarelto. Patient was in his usual state and since last night, patient has been having 2-3 painless jame bloody bowel movements with blood clots every hour. Patient endorses lightheadedness and shortness of breath after these bowel movements. Denies headache, palpitation chest pain, abdominal pain, dysuria.    Patient noted to have 3 more jame bloody BM in the ED.  Patient is a 88M, from home ambulates independently, with PMHx of Afib (on xarelto), pre-DM, HTN, HLD, gout, CVA who presents with 1 day history of jame red bloody bowel movement. Patient was admitted on 7/30-8/2 with the same presentation s/p colonoscopy and clip placed on 8/1 on discharged back on Xarelto. Patient was in his usual state and since last night, patient has been having 2-3 painless jame bloody bowel movements with blood clots every hour. Patient endorses lightheadedness and shortness of breath after these bowel movements. Denies headache, palpitation chest pain, abdominal pain, dysuria.    Patient noted to have 3 more jame bloody BM in the ED. 2U pRBC and Kcentra given in the ED.

## 2024-08-05 NOTE — DISCHARGE NOTE NURSING/CASE MANAGEMENT/SOCIAL WORK - NSDCPEFALRISK_GEN_ALL_CORE
For information on Fall & Injury Prevention, visit: [email or web address] OR  [email or web address] OR  https://www.cdc.gov/steadi/patient.html

## 2024-08-05 NOTE — CONSULT NOTE ADULT - ASSESSMENT
88M, from home ambulates independently, with PMHx of Afib (on xarelto), pre-DM, HTN, HLD, gout, CVA who presents with 1 day history of jame red bloody bowel movement,acive bleed in descending colon.  1.ICU eval called.  2.Surgical eval called.  3.Anemia-s/p 2 aprbc-await repeat cbc.  4.MILE-IVF.  5.Chronic afib-AC on hold as well as lopressor.  6.GI eval.  7.PPI.

## 2024-08-05 NOTE — DISCHARGE NOTE NURSING/CASE MANAGEMENT/SOCIAL WORK - PATIENT PORTAL LINK FT
You can access the FollowMyHealth Patient Portal offered by [facility] by registering at the following website: [email or web address]. By joining [facility]’s FollowMyHealth portal, you will also be able to view your health information using other applications (apps) compatible with our system.

## 2024-08-05 NOTE — CONSULT NOTE ADULT - SUBJECTIVE AND OBJECTIVE BOX
[  ] STAT REQUEST              [ X ] ROUTINE REQUEST    Patient is a 88 year old male with rectal bleeding. GI consulted to evaluate.        HPI:  Patient is a 88 year old male, from home ambulates independently, with past medical history significant for Afib (on Xarelto), HTN, Hyperlipidemia, gout, CVA who presented to the emergency room with 1 day history of jame red bloody bowel movement. Patient was discharged 2 days age after he was managed for diverticular bleeding. Patient had colonoscopy and clip placed on 8/1 and discharged on Xarelto. Patient denies abdominal pain, nausea, vomiting, hematemesis, melena, fever, chills, chest pain, SOB, cough, hematuria, dysuria or diarrhea.       PAIN MANAGEMENT:  Pain Scale:                 0/10  Pain Location:       Prior Colonoscopy:  088/01/2024      PAST MEDICAL HISTORY    Gout    Hypertension    Hyperlipidemia    CVA (cerebrovascular accident)    Afib        PAST SURGICAL HISTORY    No significant surgical history reported        Allergies    No Known Allergies    Intolerances  None           MEDICATIONS  (STANDING):  atorvastatin 20 milliGRAM(s) Oral at bedtime  pantoprazole    Tablet 40 milliGRAM(s) Oral before breakfast  sodium chloride 0.9%. 1000 milliLiter(s) (75 mL/Hr) IV Continuous <Continuous>  tamsulosin 0.4 milliGRAM(s) Oral at bedtime    MEDICATIONS  (PRN):  ondansetron Injectable 4 milliGRAM(s) IV Push every 8 hours PRN Nausea and/or Vomiting      SOCIAL HISTORY  Advanced Directives:       [ X ] Full Code       [  ] DNR  Marital Status:         [  ] M      [X  ] S      [  ] D       [  ] W  Children:       [ X ] Yes      [  ] No  Occupation:        [  ] Employed       [ X ] Unemployed       [  ] Retired  Diet:       [ X ] Regular       [  ] PEG feeding          [  ] NG tube feeding  Drug Use:           [ X ] Patient denied          [  ] Yes  Alcohol:           [ X ] No             [  ] Yes (socially)         [  ] Yes (chronic)  Tobacco:           [  ] Yes           [ X ] No      FAMILY HISTORY  [ X ] Heart Disease            [X  ] Diabetes             [ X ] HTN             [  ] Colon Cancer             [  ] Stomach Cancer              [  ] Pancreatic Cancer      VITAL SIGNS   Vital Signs Last 24 Hrs  T(C): 36.3 (08-05-24 @ 11:25), Max: 36.4 (08-05-24 @ 02:34)  T(F): 97.4 (08-05-24 @ 11:25), Max: 97.6 (08-05-24 @ 02:34)  HR: 53 (08-05-24 @ 11:25) (47 - 56)  BP: 130/61 (08-05-24 @ 11:25) (120/74 - 148/85)   RR: 19 (08-05-24 @ 11:25) (15 - 20)  SpO2: 99% (08-05-24 @ 11:25) (98% - 99%)  Daily Height in cm: 157.48 (05 Aug 2024 02:34)             CBC Full  -  ( 05 Aug 2024 04:36 )  WBC Count : 8.25 K/uL  RBC Count : 2.84 M/uL  Hemoglobin : 7.4 g/dL  Hematocrit : 23.7 %  Platelet Count - Automated : 179 K/uL  Mean Cell Volume : 83.5 fl  Mean Cell Hemoglobin : 26.1 pg  Mean Cell Hemoglobin Concentration : 31.2 gm/dL  Auto Neutrophil # : 5.11 K/uL  Auto Lymphocyte # : 1.95 K/uL  Auto Monocyte # : 1.06 K/uL  Auto Eosinophil # : 0.05 K/uL  Auto Basophil # : 0.04 K/uL  Auto Neutrophil % : 62.0 %  Auto Lymphocyte % : 23.6 %  Auto Monocyte % : 12.8 %  Auto Eosinophil % : 0.6 %  Auto Basophil % : 0.5 %      08-05    139  |  108  |  14  ----------------------------<  109<H>  3.8   |  26  |  1.53<H>    Ca    8.3<L>      05 Aug 2024 04:36    TPro  6.0  /  Alb  3.1<L>  /  TBili  1.1  /  DBili  x   /  AST  54<H>  /  ALT  44  /  AlkPhos  99  08-05    Lipase: 48 U/L (08-05 @ 04:36)     PT/INR - ( 05 Aug 2024 04:36 )   PT: 24.6 sec;   INR: 2.21 ratio       PTT - ( 05 Aug 2024 04:36 )  PTT:32.8 sec      Urinalysis with Rflx Culture (08.05.24 @ 06:12)   Urine Appearance: Clear  Color: Yellow  Specific Gravity: 1.022  pH Urine: 5.0  Protein, Urine: Negative mg/dL  Glucose Qualitative, Urine: Negative mg/dL  Ketone - Urine: Negative mg/dL  Blood, Urine: Negative  Bilirubin: Negative  Urobilinogen: 0.2 mg/dL  Leukocyte Esterase Concentration: Negative  Nitrite: Negative    ECG     Ventricular Rate 44 BPM    Atrial Rate 258 BPM    QRS Duration 98 ms    Q-T Interval 468 ms    QTC Calculation(Bazett) 400 ms    R Axis 56 degrees    T Axis 68 degrees    Diagnosis Line Atrial fibrillation with slow ventricular response  Minimal voltage criteria for LVH, may be normal variant  Abnormal ECG           RADIOLOGY/IMAGING                  ACC: [id number] EXAM:  CT ABDOMEN AND PELVIS IC   ORDERED BY: GOLDIE ESCUDERO     PROCEDURE DATE:  08/05/2024          INTERPRETATION:  CLINICAL INFORMATION: GI bleed.    COMPARISON: CT abdomen and pelvis 7/31/2024..    CONTRAST/COMPLICATIONS:  IV Contrast: Omnipaque 350  90 cc administered   10 cc discarded  Oral Contrast: NONE  Complications: None reported at time of study completion    PROCEDURE:  CT of the Abdomen and Pelvis was performed.  Precontrast, Arterial and Delayed phases were performed.  Sagittal and coronal reformats were performed.    FINDINGS:  LOWER CHEST: Cardiomegaly. Coronary artery calcifications. Elevated left   hemidiaphragm with adjacent compressive atelectasis.    LIVER: Steatosis.  BILE DUCTS: Normal caliber.  GALLBLADDER: Sludge and/or gallstones.  SPLEEN: Within normal limits.  PANCREAS: Within normal limits.  ADRENALS: Within normal limits.  KIDNEYS/URETERS: No hydronephrosis. Bilateral renal cysts as well as too   small to characterize hyperdensities..    BLADDER:Within normal limits.  REPRODUCTIVE ORGANS: No prostatomegaly.    BOWEL: No bowel obstruction. Diverticulosis coli. Intraluminal   extravasation of contrast identified in mid to lower descending colonic   loop (42-45:13), compatible with active GI bleed. No overt bowel wall   thickening.  PERITONEUM/RETROPERITONEUM: Within normal limits.  VESSELS: Atherosclerotic changes.  LYMPH NODES: No lymphadenopathy.  ABDOMINAL WALL: Within normal limits.  BONES: Degenerative changes.    IMPRESSION:    Active GI bleed in mid to distal descending colonic loop. Further GI   workup and imaging follow-up is suggested. Diverticulosis coli.               [  ] STAT REQUEST              [ X ] ROUTINE REQUEST    Patient is a 88 year old male with rectal bleeding. GI consulted to evaluate.        HPI:  Patient is a 88 year old male, from home ambulates independently, with past medical history significant for Afib (on Xarelto), HTN, Hyperlipidemia, gout, CVA who presented to the emergency room with 1 day history of jame red bloody bowel movement. Patient was discharged 2 days age after he was managed for diverticular bleeding. Patient had colonoscopy and clip placed on 8/1 and discharged on Xarelto. Patient denies abdominal pain, nausea, vomiting, hematemesis, melena, fever, chills, chest pain, SOB, cough, hematuria, dysuria or diarrhea.       PAIN MANAGEMENT:  Pain Scale:                 0/10  Pain Location:       Prior Colonoscopy:  088/01/2024      PAST MEDICAL HISTORY    Gout    Hypertension    Hyperlipidemia    CVA (cerebrovascular accident)    Afib        PAST SURGICAL HISTORY    No significant surgical history reported        Allergies    No Known Allergies    Intolerances  None           MEDICATIONS  (STANDING):  atorvastatin 20 milliGRAM(s) Oral at bedtime  pantoprazole    Tablet 40 milliGRAM(s) Oral before breakfast  sodium chloride 0.9%. 1000 milliLiter(s) (75 mL/Hr) IV Continuous <Continuous>  tamsulosin 0.4 milliGRAM(s) Oral at bedtime    MEDICATIONS  (PRN):  ondansetron Injectable 4 milliGRAM(s) IV Push every 8 hours PRN Nausea and/or Vomiting      SOCIAL HISTORY  Advanced Directives:       [ X ] Full Code       [  ] DNR  Marital Status:         [  ] M      [X  ] S      [  ] D       [  ] W  Children:       [ X ] Yes      [  ] No  Occupation:        [  ] Employed       [ X ] Unemployed       [  ] Retired  Diet:       [ X ] Regular       [  ] PEG feeding          [  ] NG tube feeding  Drug Use:           [ X ] Patient denied          [  ] Yes  Alcohol:           [ X ] No             [  ] Yes (socially)         [  ] Yes (chronic)  Tobacco:           [  ] Yes           [ X ] No      FAMILY HISTORY  [ X ] Heart Disease            [X  ] Diabetes             [ X ] HTN             [  ] Colon Cancer             [  ] Stomach Cancer              [  ] Pancreatic Cancer      VITAL SIGNS   Vital Signs Last 24 Hrs  T(C): 36.3 (08-05-24 @ 11:25), Max: 36.4 (08-05-24 @ 02:34)  T(F): 97.4 (08-05-24 @ 11:25), Max: 97.6 (08-05-24 @ 02:34)  HR: 53 (08-05-24 @ 11:25) (47 - 56)  BP: 130/61 (08-05-24 @ 11:25) (120/74 - 148/85)   RR: 19 (08-05-24 @ 11:25) (15 - 20)  SpO2: 99% (08-05-24 @ 11:25) (98% - 99%)  Daily Height in cm: 157.48 (05 Aug 2024 02:34)             CBC Full  -  ( 05 Aug 2024 04:36 )  WBC Count : 8.25 K/uL  RBC Count : 2.84 M/uL  Hemoglobin : 7.4 g/dL  Hematocrit : 23.7 %  Platelet Count - Automated : 179 K/uL  Mean Cell Volume : 83.5 fl  Mean Cell Hemoglobin : 26.1 pg  Mean Cell Hemoglobin Concentration : 31.2 gm/dL  Auto Neutrophil # : 5.11 K/uL  Auto Lymphocyte # : 1.95 K/uL  Auto Monocyte # : 1.06 K/uL  Auto Eosinophil # : 0.05 K/uL  Auto Basophil # : 0.04 K/uL  Auto Neutrophil % : 62.0 %  Auto Lymphocyte % : 23.6 %  Auto Monocyte % : 12.8 %  Auto Eosinophil % : 0.6 %  Auto Basophil % : 0.5 %      08-05    139  |  108  |  14  ----------------------------<  109<H>  3.8   |  26  |  1.53<H>    Ca    8.3<L>      05 Aug 2024 04:36    TPro  6.0  /  Alb  3.1<L>  /  TBili  1.1  /  DBili  x   /  AST  54<H>  /  ALT  44  /  AlkPhos  99  08-05    Lipase: 48 U/L (08-05 @ 04:36)     PT/INR - ( 05 Aug 2024 04:36 )   PT: 24.6 sec;   INR: 2.21 ratio       PTT - ( 05 Aug 2024 04:36 )  PTT:32.8 sec      Urinalysis with Rflx Culture (08.05.24 @ 06:12)   Urine Appearance: Clear  Color: Yellow  Specific Gravity: 1.022  pH Urine: 5.0  Protein, Urine: Negative mg/dL  Glucose Qualitative, Urine: Negative mg/dL  Ketone - Urine: Negative mg/dL  Blood, Urine: Negative  Bilirubin: Negative  Urobilinogen: 0.2 mg/dL  Leukocyte Esterase Concentration: Negative  Nitrite: Negative    ECG     Ventricular Rate 44 BPM    Atrial Rate 258 BPM    QRS Duration 98 ms    Q-T Interval 468 ms    QTC Calculation(Bazett) 400 ms    R Axis 56 degrees    T Axis 68 degrees    Diagnosis Line Atrial fibrillation with slow ventricular response  Minimal voltage criteria for LVH, may be normal variant  Abnormal ECG           RADIOLOGY/IMAGING                  ACC: [id number] EXAM:  CT ABDOMEN AND PELVIS IC   ORDERED BY: GOLDIE ESCUDERO     PROCEDURE DATE:  08/05/2024          INTERPRETATION:  CLINICAL INFORMATION: GI bleed.    COMPARISON: CT abdomen and pelvis 7/31/2024..    CONTRAST/COMPLICATIONS:  IV Contrast: Omnipaque 350  90 cc administered   10 cc discarded  Oral Contrast: NONE  Complications: None reported at time of study completion    PROCEDURE:  CT of the Abdomen and Pelvis was performed.  Precontrast, Arterial and Delayed phases were performed.  Sagittal and coronal reformats were performed.    FINDINGS:  LOWER CHEST: Cardiomegaly. Coronary artery calcifications. Elevated left   hemidiaphragm with adjacent compressive atelectasis.    LIVER: Steatosis.  BILE DUCTS: Normal caliber.  GALLBLADDER: Sludge and/or gallstones.  SPLEEN: Within normal limits.  PANCREAS: Within normal limits.  ADRENALS: Within normal limits.  KIDNEYS/URETERS: No hydronephrosis. Bilateral renal cysts as well as too   small to characterize hyperdensities..    BLADDER:Within normal limits.  REPRODUCTIVE ORGANS: No prostatomegaly.    BOWEL: No bowel obstruction. Diverticulosis coli. Intraluminal   extravasation of contrast identified in mid to lower descending colonic   loop (42-45:13), compatible with active GI bleed. No overt bowel wall   thickening.  PERITONEUM/RETROPERITONEUM: Within normal limits.  VESSELS: Atherosclerotic changes.  LYMPH NODES: No lymphadenopathy.  ABDOMINAL WALL: Within normal limits.  BONES: Degenerative changes.    IMPRESSION:    Active GI bleed in mid to distal descending colonic loop. Further GI   workup and imaging follow-up is suggested. Diverticulosis coli.

## 2024-08-06 LAB
MRSA PCR RESULT.: SIGNIFICANT CHANGE UP
S AUREUS DNA NOSE QL NAA+PROBE: SIGNIFICANT CHANGE UP

## 2024-08-06 RX ORDER — TAMSULOSIN HCL 0.4 MG
1 CAPSULE ORAL
Refills: 0 | DISCHARGE

## 2024-08-06 RX ORDER — COLCHICINE 0.6 MG/1
1 TABLET, FILM COATED ORAL
Refills: 0 | DISCHARGE

## 2024-08-06 RX ORDER — PANTOPRAZOLE SODIUM 20 MG/1
1 TABLET, DELAYED RELEASE ORAL
Refills: 0 | DISCHARGE

## 2024-08-06 RX ORDER — ATORVASTATIN CALCIUM 40 MG/1
1 TABLET, FILM COATED ORAL
Refills: 0 | DISCHARGE

## 2024-08-08 LAB — DIGITOXIN SERPL-MCNC: <5 NG/ML — LOW (ref 10–25)

## 2024-08-09 ENCOUNTER — RESULT REVIEW (OUTPATIENT)
Age: 89
End: 2024-08-09

## 2024-08-13 ENCOUNTER — TRANSCRIPTION ENCOUNTER (OUTPATIENT)
Age: 89
End: 2024-08-13

## 2024-08-14 PROBLEM — Z00.00 ENCOUNTER FOR PREVENTIVE HEALTH EXAMINATION: Status: ACTIVE | Noted: 2024-08-14

## 2024-09-03 ENCOUNTER — NON-APPOINTMENT (OUTPATIENT)
Age: 89
End: 2024-09-03

## 2024-09-03 ENCOUNTER — APPOINTMENT (OUTPATIENT)
Dept: ELECTROPHYSIOLOGY | Facility: CLINIC | Age: 89
End: 2024-09-03
Payer: MEDICARE

## 2024-09-03 VITALS
WEIGHT: 170 LBS | DIASTOLIC BLOOD PRESSURE: 65 MMHG | OXYGEN SATURATION: 93 % | HEIGHT: 65 IN | HEART RATE: 67 BPM | BODY MASS INDEX: 28.32 KG/M2 | SYSTOLIC BLOOD PRESSURE: 159 MMHG

## 2024-09-03 DIAGNOSIS — I48.19 OTHER PERSISTENT ATRIAL FIBRILLATION: ICD-10-CM

## 2024-09-03 DIAGNOSIS — Z86.39 PERSONAL HISTORY OF OTHER ENDOCRINE, NUTRITIONAL AND METABOLIC DISEASE: ICD-10-CM

## 2024-09-03 DIAGNOSIS — K92.2 GASTROINTESTINAL HEMORRHAGE, UNSPECIFIED: ICD-10-CM

## 2024-09-03 DIAGNOSIS — I10 ESSENTIAL (PRIMARY) HYPERTENSION: ICD-10-CM

## 2024-09-03 DIAGNOSIS — E11.9 TYPE 2 DIABETES MELLITUS W/OUT COMPLICATIONS: ICD-10-CM

## 2024-09-03 PROCEDURE — 93000 ELECTROCARDIOGRAM COMPLETE: CPT

## 2024-09-03 PROCEDURE — 99214 OFFICE O/P EST MOD 30 MIN: CPT | Mod: 25

## 2024-09-03 RX ORDER — TAMSULOSIN HYDROCHLORIDE 0.4 MG/1
0.4 CAPSULE ORAL
Qty: 30 | Refills: 1 | Status: ACTIVE | COMMUNITY
Start: 2024-09-03

## 2024-09-03 RX ORDER — METFORMIN HYDROCHLORIDE 500 MG/1
500 TABLET, COATED ORAL
Qty: 60 | Refills: 3 | Status: ACTIVE | COMMUNITY
Start: 2024-09-03

## 2024-09-03 RX ORDER — ATORVASTATIN CALCIUM 20 MG/1
20 TABLET, FILM COATED ORAL
Qty: 1 | Refills: 2 | Status: ACTIVE | COMMUNITY
Start: 2024-09-03

## 2024-09-03 RX ORDER — PANTOPRAZOLE 40 MG/1
40 TABLET, DELAYED RELEASE ORAL DAILY
Qty: 30 | Refills: 1 | Status: ACTIVE | COMMUNITY
Start: 2024-09-03

## 2024-09-03 RX ORDER — RIVAROXABAN 15 MG/1
15 TABLET, FILM COATED ORAL
Qty: 30 | Refills: 3 | Status: ACTIVE | COMMUNITY
Start: 2024-09-03

## 2024-09-03 RX ORDER — METOPROLOL TARTRATE 50 MG/1
50 TABLET, FILM COATED ORAL
Qty: 180 | Refills: 3 | Status: ACTIVE | COMMUNITY
Start: 2024-09-03

## 2024-09-03 RX ORDER — VALSARTAN AND HYDROCHLOROTHIAZIDE 160; 12.5 MG/1; MG/1
160-12.5 TABLET, FILM COATED ORAL DAILY
Refills: 0 | Status: ACTIVE | COMMUNITY
Start: 2024-09-03

## 2024-09-03 NOTE — DISCUSSION/SUMMARY
[EKG obtained to assist in diagnosis and management of assessed problem(s)] : EKG obtained to assist in diagnosis and management of assessed problem(s) [FreeTextEntry1] : Impression:  1. Afib s/p GIB on oral a/c:  EKG performed today to assess for presence of conduction disease, pre-excitation or atrial fibrillation reveals AF with PVC's. Pt is taking very low dose of Xarelto and underdosing may contribute to CVA and risk of bleeding with higher dose. Discussed RAIZA Watchman procedure Discussed with patient's daughter and granddaughter by phone and they translated regarding risks/benefits and alternatives of Watchman procedure, risks including and not limited to infection, bleeding, cardiac tamponade, death, thrombosis on Watchman device while not on AC or antiplatelets. All questions answered. Without full therapeutic dosing of AC, patient at increased risk of stroke as patient has a very high [id number] score. They demonstrated understanding of risks, benefits and alternatives. They will discuss further as a family and will call if they want to schedule the procedure.  2. HTN; Encourage heart healthy diet, sodium restriction and weight management.. Continue regular f/u with Cardiologist for further HTN management.  3. HLD: resume statin therapy as prescribed. Regular follow up with Cardiologist for routine lipid monitoring and management.   Continue f/u with Cardiologist and RTO for f/u in 6 months.

## 2024-09-03 NOTE — HISTORY OF PRESENT ILLNESS
[FreeTextEntry1] : MR. WALLACE CORNEJO  is an 89 y/o man with hx of Afib (on xarelto), DM (Hba1c 6.6) , HTN, HLD, gout, CVA who is here for an initial evaluation for consideration of RAIZA closure/Watchman procedure.   He had a recent admission 7/30-8/3 for GI bleed s/p colonoscopy and clip placement discharged on Xarelto now returned with GI bleed that began 8/4. Was admitted to [facility] where he received 4 U PRBCs and Kcentra. CTA demonstrated active GI bleed. Patient was hemodynamically stable but didn't have appropriate transfusion response and was transferred to [facility] for potential IR intervention if he were to decompensate. Hb/Hct stable at this time. EP is following for possible Watchman placement. He is not on telemetry at this time.   Currently, pt is taking Xarelto 2.5 mg daily. Creat clearance on 8/13/24 was 71 with BUN/creat 14/1.02 and H&H 9.4/28.8 from 7.4/23 on 8/10/24.  His [id number] score is 6.   Denies chest pain, palpitations, SOB, syncope or near syncope.

## 2024-09-03 NOTE — CARDIOLOGY SUMMARY
[de-identified] : 9/3/24 AF 78 with PVC's, NSST [de-identified] : 8/9/24 TTE: normal LVEF CONCLUSIONS:    1. Left ventricular systolic function is normal with an ejection fraction of 71 % by Hoskins's method of disks. There are no regional wall motion abnormalities seen.  2. Normal right ventricular cavity size and normal right ventricular systolic function. Tricuspid annular plane systolic excursion (TAPSE) is 2.1 cm (normal >=1.7 cm).  3. Left atrium is moderately dilated.  4. The right atrium is normal in size.  5. The peak transaortic velocity is 2.23 m/s, peak transaortic gradient is 19.9 mmHg and mean transaortic gradient is 10.3 mmHg with an LVOT/aortic valve VTI ratio of 0.64. The effective orifice area is estimated at 1.77 cm by the continuity equation.  6. The inferior vena cava is dilated measuring 2.36 cm in diameter, (dilated >2.1cm) with normal inspiratory collapse (normal >50%) consistent with mildly elevated right atrial pressure (~8, range 5-10mmHg).  7. Estimated pulmonary artery systolic pressure is 44 mmHg.  8. No pericardial effusion seen.  9. Mild aortic stenosis.

## 2024-09-03 NOTE — PHYSICAL EXAM
[Well Developed] : well developed [Well Nourished] : well nourished [No Acute Distress] : no acute distress [Normal Conjunctiva] : normal conjunctiva [Normal Venous Pressure] : normal venous pressure [No Carotid Bruit] : no carotid bruit [Clear Lung Fields] : clear lung fields [Good Air Entry] : good air entry [No Respiratory Distress] : no respiratory distress  [Soft] : abdomen soft [Non Tender] : non-tender [Normal Bowel Sounds] : normal bowel sounds [Normal Gait] : normal gait [No Edema] : no edema [No Cyanosis] : no cyanosis [No Clubbing] : no clubbing [No Varicosities] : no varicosities [No Rash] : no rash [No Skin Lesions] : no skin lesions [Moves all extremities] : moves all extremities [No Focal Deficits] : no focal deficits [Normal Speech] : normal speech [Alert and Oriented] : alert and oriented [Normal memory] : normal memory [de-identified] : irregularly irregular

## 2024-09-03 NOTE — CARDIOLOGY SUMMARY
[de-identified] : 9/3/24 AF 78 with PVC's, NSST [de-identified] : 8/9/24 TTE: normal LVEF CONCLUSIONS:    1. Left ventricular systolic function is normal with an ejection fraction of 71 % by Hoskins's method of disks. There are no regional wall motion abnormalities seen.  2. Normal right ventricular cavity size and normal right ventricular systolic function. Tricuspid annular plane systolic excursion (TAPSE) is 2.1 cm (normal >=1.7 cm).  3. Left atrium is moderately dilated.  4. The right atrium is normal in size.  5. The peak transaortic velocity is 2.23 m/s, peak transaortic gradient is 19.9 mmHg and mean transaortic gradient is 10.3 mmHg with an LVOT/aortic valve VTI ratio of 0.64. The effective orifice area is estimated at 1.77 cm by the continuity equation.  6. The inferior vena cava is dilated measuring 2.36 cm in diameter, (dilated >2.1cm) with normal inspiratory collapse (normal >50%) consistent with mildly elevated right atrial pressure (~8, range 5-10mmHg).  7. Estimated pulmonary artery systolic pressure is 44 mmHg.  8. No pericardial effusion seen.  9. Mild aortic stenosis.

## 2024-09-03 NOTE — PHYSICAL EXAM
[Well Developed] : well developed [Well Nourished] : well nourished [No Acute Distress] : no acute distress [Normal Conjunctiva] : normal conjunctiva [Normal Venous Pressure] : normal venous pressure [No Carotid Bruit] : no carotid bruit [Clear Lung Fields] : clear lung fields [Good Air Entry] : good air entry [No Respiratory Distress] : no respiratory distress  [Soft] : abdomen soft [Non Tender] : non-tender [Normal Bowel Sounds] : normal bowel sounds [Normal Gait] : normal gait [No Edema] : no edema [No Cyanosis] : no cyanosis [No Clubbing] : no clubbing [No Varicosities] : no varicosities [No Rash] : no rash [No Skin Lesions] : no skin lesions [Moves all extremities] : moves all extremities [No Focal Deficits] : no focal deficits [Normal Speech] : normal speech [Alert and Oriented] : alert and oriented [Normal memory] : normal memory [de-identified] : irregularly irregular

## 2024-09-03 NOTE — HISTORY OF PRESENT ILLNESS
[FreeTextEntry1] : MR. WALLACE CORNEJO  is an 89 y/o man with hx of Afib (on xarelto), DM (Hba1c 6.6) , HTN, HLD, gout, CVA who is here for an initial evaluation for consideration of RAIZA closure/Watchman procedure.   He had a recent admission 7/30-8/3 for GI bleed s/p colonoscopy and clip placement discharged on Xarelto now returned with GI bleed that began 8/4. Was admitted to [facility] where he received 4 U PRBCs and Kcentra. CTA demonstrated active GI bleed. Patient was hemodynamically stable but didn't have appropriate transfusion response and was transferred to [facility] for potential IR intervention if he were to decompensate. Hb/Hct stable at this time. EP is following for possible Watchman placement. He is not on telemetry at this time.   Currently, pt is taking Xarelto 2.5 mg daily. Creat clearance on 8/13/24 was 71 with BUN/creat 14/1.02 and H&H 9.4/28.8 from 7.4/23 on 8/10/24.  His [id number] score is 6.   Denies chest pain, palpitations, SOB, syncope or near syncope.

## 2024-09-03 NOTE — DISCUSSION/SUMMARY
[EKG obtained to assist in diagnosis and management of assessed problem(s)] : EKG obtained to assist in diagnosis and management of assessed problem(s) [FreeTextEntry1] : Impression:  1. Afib s/p GIB on oral a/c:  EKG performed today to assess for presence of conduction disease, pre-excitation or atrial fibrillation reveals AF with PVC's. Pt is taking very low dose of Xarelto and underdosing may contribute to CVA and risk of bleeding with higher dose. Discussed RAIZA Watchman procedure Discussed with patient's daughter and granddaughter by phone and they translated regarding risks/benefits and alternatives of Watchman procedure, risks including and not limited to infection, bleeding, cardiac tamponade, death, thrombosis on Watchman device while not on AC or antiplatelets. All questions answered. Without full therapeutic dosing of AC, patient at increased risk of stroke as patient has a very high [id number] score. They demonstrated understanding of risks, benefits and alternatives. They will discuss further as a family and will call if they want to schedule the procedure.  2. HTN; Encourage heart healthy diet, sodium restriction and weight management.. Continue regular f/u with Cardiologist for further HTN management.  3. HLD: resume statin therapy as prescribed. Regular follow up with Cardiologist for routine lipid monitoring and management.   Continue f/u with Cardiologist and RTO for f/u in 6 months.

## 2024-10-23 ENCOUNTER — OUTPATIENT (OUTPATIENT)
Dept: OUTPATIENT SERVICES | Facility: HOSPITAL | Age: 89
LOS: 1 days | End: 2024-10-23

## 2024-10-23 VITALS
TEMPERATURE: 98 F | DIASTOLIC BLOOD PRESSURE: 74 MMHG | HEART RATE: 53 BPM | HEIGHT: 64 IN | OXYGEN SATURATION: 97 % | SYSTOLIC BLOOD PRESSURE: 159 MMHG | RESPIRATION RATE: 16 BRPM | WEIGHT: 175.05 LBS

## 2024-10-23 DIAGNOSIS — Z98.49 CATARACT EXTRACTION STATUS, UNSPECIFIED EYE: Chronic | ICD-10-CM

## 2024-10-23 DIAGNOSIS — I48.91 UNSPECIFIED ATRIAL FIBRILLATION: ICD-10-CM

## 2024-10-23 DIAGNOSIS — E11.9 TYPE 2 DIABETES MELLITUS WITHOUT COMPLICATIONS: ICD-10-CM

## 2024-10-23 DIAGNOSIS — I10 ESSENTIAL (PRIMARY) HYPERTENSION: ICD-10-CM

## 2024-10-23 DIAGNOSIS — G47.33 OBSTRUCTIVE SLEEP APNEA (ADULT) (PEDIATRIC): ICD-10-CM

## 2024-10-23 DIAGNOSIS — I48.19 OTHER PERSISTENT ATRIAL FIBRILLATION: ICD-10-CM

## 2024-10-23 LAB
A1C WITH ESTIMATED AVERAGE GLUCOSE RESULT: 6.1 % — HIGH (ref 4–5.6)
ALBUMIN SERPL ELPH-MCNC: 4.2 G/DL — SIGNIFICANT CHANGE UP (ref 3.3–5)
ALP SERPL-CCNC: 106 U/L — SIGNIFICANT CHANGE UP (ref 40–120)
ALT FLD-CCNC: 14 U/L — SIGNIFICANT CHANGE UP (ref 4–41)
ANION GAP SERPL CALC-SCNC: 11 MMOL/L — SIGNIFICANT CHANGE UP (ref 7–14)
AST SERPL-CCNC: 23 U/L — SIGNIFICANT CHANGE UP (ref 4–40)
BASOPHILS # BLD AUTO: 0.14 K/UL — SIGNIFICANT CHANGE UP (ref 0–0.2)
BASOPHILS NFR BLD AUTO: 1.4 % — SIGNIFICANT CHANGE UP (ref 0–2)
BILIRUB SERPL-MCNC: 1.3 MG/DL — HIGH (ref 0.2–1.2)
BLD GP AB SCN SERPL QL: NEGATIVE — SIGNIFICANT CHANGE UP
BUN SERPL-MCNC: 16 MG/DL — SIGNIFICANT CHANGE UP (ref 7–23)
CALCIUM SERPL-MCNC: 9.9 MG/DL — SIGNIFICANT CHANGE UP (ref 8.4–10.5)
CHLORIDE SERPL-SCNC: 100 MMOL/L — SIGNIFICANT CHANGE UP (ref 98–107)
CO2 SERPL-SCNC: 29 MMOL/L — SIGNIFICANT CHANGE UP (ref 22–31)
CREAT SERPL-MCNC: 1.15 MG/DL — SIGNIFICANT CHANGE UP (ref 0.5–1.3)
CRP SERPL-MCNC: 12 MG/L — HIGH
EGFR: 62 ML/MIN/1.73M2 — SIGNIFICANT CHANGE UP
EOSINOPHIL # BLD AUTO: 0.23 K/UL — SIGNIFICANT CHANGE UP (ref 0–0.5)
EOSINOPHIL NFR BLD AUTO: 2.2 % — SIGNIFICANT CHANGE UP (ref 0–6)
ESTIMATED AVERAGE GLUCOSE: 128 — SIGNIFICANT CHANGE UP
FERRITIN SERPL-MCNC: 23 NG/ML — LOW (ref 30–400)
GLUCOSE SERPL-MCNC: 91 MG/DL — SIGNIFICANT CHANGE UP (ref 70–99)
HCT VFR BLD CALC: 37.3 % — LOW (ref 39–50)
HGB BLD-MCNC: 11.5 G/DL — LOW (ref 13–17)
IMM GRANULOCYTES NFR BLD AUTO: 0.3 % — SIGNIFICANT CHANGE UP (ref 0–0.9)
IRON SATN MFR SERPL: 11 % — LOW (ref 16–55)
IRON SATN MFR SERPL: 43 UG/DL — LOW (ref 45–165)
LYMPHOCYTES # BLD AUTO: 19.5 % — SIGNIFICANT CHANGE UP (ref 13–44)
LYMPHOCYTES # BLD AUTO: 2 K/UL — SIGNIFICANT CHANGE UP (ref 1–3.3)
MCHC RBC-ENTMCNC: 24.1 PG — LOW (ref 27–34)
MCHC RBC-ENTMCNC: 30.8 GM/DL — LOW (ref 32–36)
MCV RBC AUTO: 78.2 FL — LOW (ref 80–100)
MONOCYTES # BLD AUTO: 1.31 K/UL — HIGH (ref 0–0.9)
MONOCYTES NFR BLD AUTO: 12.8 % — SIGNIFICANT CHANGE UP (ref 2–14)
NEUTROPHILS # BLD AUTO: 6.54 K/UL — SIGNIFICANT CHANGE UP (ref 1.8–7.4)
NEUTROPHILS NFR BLD AUTO: 63.8 % — SIGNIFICANT CHANGE UP (ref 43–77)
PLATELET # BLD AUTO: 248 K/UL — SIGNIFICANT CHANGE UP (ref 150–400)
POTASSIUM SERPL-MCNC: 4.3 MMOL/L — SIGNIFICANT CHANGE UP (ref 3.5–5.3)
POTASSIUM SERPL-SCNC: 4.3 MMOL/L — SIGNIFICANT CHANGE UP (ref 3.5–5.3)
PROT SERPL-MCNC: 7.8 G/DL — SIGNIFICANT CHANGE UP (ref 6–8.3)
RBC # BLD: 4.77 M/UL — SIGNIFICANT CHANGE UP (ref 4.2–5.8)
RBC # FLD: 19.2 % — HIGH (ref 10.3–14.5)
RETICS #: 50.6 K/UL — SIGNIFICANT CHANGE UP (ref 25–125)
RETICS/RBC NFR: 1.1 % — SIGNIFICANT CHANGE UP (ref 0.5–2.5)
RH IG SCN BLD-IMP: POSITIVE — SIGNIFICANT CHANGE UP
RH IG SCN BLD-IMP: POSITIVE — SIGNIFICANT CHANGE UP
SODIUM SERPL-SCNC: 140 MMOL/L — SIGNIFICANT CHANGE UP (ref 135–145)
TIBC SERPL-MCNC: 396 UG/DL — SIGNIFICANT CHANGE UP (ref 220–430)
UIBC SERPL-MCNC: 353 UG/DL — SIGNIFICANT CHANGE UP (ref 110–370)
WBC # BLD: 10.25 K/UL — SIGNIFICANT CHANGE UP (ref 3.8–10.5)
WBC # FLD AUTO: 10.25 K/UL — SIGNIFICANT CHANGE UP (ref 3.8–10.5)

## 2024-10-23 NOTE — H&P PST ADULT - HISTORY OF PRESENT ILLNESS
89 y/o man Scottish speaking male with hx of Afib (on xarelto), T2 DM,  HTN, HLD, Gout, CVA , presents for preop evaluation for KALEY/ Watchman procedure tentatively scheduled  for 10/30/24.    Pt had a recent admission 7/30-8/3 for GI bleed s/p colonoscopy and clip placement discharged on Xarelto. Pt had GI bleed that began 8/4. Was admitted to [facility] where he received 4 U PRBCs and Kcentra. CTA demonstrated active GI bleed. Patient was hemodynamically stable but didn't have appropriate transfusion response and was transferred to [facility] for potential IR intervention  if he were to decompensate. Hb/Hct stable and  EP was  following for possible Watchman placement.  88 y/o man [place]an speaking male with hx of Afib (on Xarelto T2 DM,  HTN, HLD, Gout, CVA , presents for preop evaluation for KALEY/ Watchman procedure tentatively scheduled  for 10/30/24.    Pt had a recent admission 7/30-8/3 for GI bleed s/p colonoscopy and clip placement discharged on Xarelto. Pt had GI bleed that began 8/4. Was admitted to [facility] where he received 4 U PRBCs and Kcentra. CTA demonstrated active GI bleed. Patient was hemodynamically stable but didn't have appropriate transfusion response and was transferred to [facility] for potential IR intervention  if he were to decompensate. Hb/Hct stable and  EP was  following for possible Watchman placement.

## 2024-10-23 NOTE — H&P PST ADULT - NSANTHOSAYNRD_GEN_A_CORE
No. NICHELLE screening performed.  STOP BANG Legend: 0-2 = LOW Risk; 3-4 = INTERMEDIATE Risk; 5-8 = HIGH Risk

## 2024-10-23 NOTE — H&P PST ADULT - NSICDXPASTMEDICALHX_GEN_ALL_CORE_FT
PAST MEDICAL HISTORY:  A-fib     CVA (cerebrovascular accident)     Gout     H/O: GI bleed     History of blood transfusion     History of colonoscopy     History of diverticulitis     Hyperlipidemia     Hypertension

## 2024-10-23 NOTE — H&P PST ADULT - PROBLEM SELECTOR PLAN 4
Patient eligible for [facility] risk screen age>75?  (if <= 74 then done) Yes     Health care proxy paperwork given to patient? Yes (all patients should be given the packet to fill out at home and return on day of surgery to pre-op RN) Yes     Impaired mobility (ie: uses cane, walker, wheelchair, or assist device)? No     Known dementia diagnosis? No     Impaired functional status (METS<4)? No     Malnutrition BMI<20?  No Pt met criteria for NICHELLE precautions. Stop Bang Score 4

## 2024-10-23 NOTE — H&P PST ADULT - ENMT COMMENTS
Left message in reference to needing additional/updated labs completed prior to Hematology appt. Lab orders placed.  [facility] message sent.       Mallampati 2

## 2024-10-23 NOTE — H&P PST ADULT - PROBLEM SELECTOR PLAN 2
Pt instructed to hold all cardiac meds ( Metoprolol and Valsartan, Furosamide ) day of procedure as per Dr Jett's instructions

## 2024-10-23 NOTE — H&P PST ADULT - LAST ECHOCARDIOGRAM
2024 2024  Normal LVSF - EF 71 %, Normal right ventricular cavity size and normal right ventricular systolic function

## 2024-10-23 NOTE — H&P PST ADULT - NEGATIVE NEUROLOGICAL SYMPTOMS
no transient paralysis/no weakness/no paresthesias/no syncope/no tremors/no vertigo/no difficulty walking

## 2024-10-23 NOTE — H&P PST ADULT - PROBLEM SELECTOR PLAN 5
Patient eligible for [facility] risk screen age>75?  (if <= 74 then done) Yes     Health care proxy paperwork given to patient? Yes (all patients should be given the packet to fill out at home and return on day of surgery to pre-op RN) Yes     Impaired mobility (ie: uses cane, walker, wheelchair, or assist device)? No     Known dementia diagnosis? No     Impaired functional status (METS<4)? No     Malnutrition BMI<20?  No

## 2024-10-30 ENCOUNTER — TRANSCRIPTION ENCOUNTER (OUTPATIENT)
Age: 89
End: 2024-10-30

## 2024-10-30 ENCOUNTER — RESULT REVIEW (OUTPATIENT)
Age: 89
End: 2024-10-30

## 2024-10-30 ENCOUNTER — OUTPATIENT (OUTPATIENT)
Dept: OUTPATIENT SERVICES | Facility: HOSPITAL | Age: 89
LOS: 1 days | Discharge: HOME | End: 2024-10-30
Payer: MEDICARE

## 2024-10-30 VITALS
RESPIRATION RATE: 16 BRPM | TEMPERATURE: 98 F | HEART RATE: 40 BPM | DIASTOLIC BLOOD PRESSURE: 76 MMHG | SYSTOLIC BLOOD PRESSURE: 197 MMHG | WEIGHT: 164.91 LBS | HEIGHT: 65 IN

## 2024-10-30 VITALS
SYSTOLIC BLOOD PRESSURE: 164 MMHG | DIASTOLIC BLOOD PRESSURE: 78 MMHG | OXYGEN SATURATION: 97 % | RESPIRATION RATE: 16 BRPM | HEART RATE: 55 BPM

## 2024-10-30 DIAGNOSIS — Z98.49 CATARACT EXTRACTION STATUS, UNSPECIFIED EYE: Chronic | ICD-10-CM

## 2024-10-30 DIAGNOSIS — I48.19 OTHER PERSISTENT ATRIAL FIBRILLATION: ICD-10-CM

## 2024-10-30 PROBLEM — Z98.890 OTHER SPECIFIED POSTPROCEDURAL STATES: Chronic | Status: ACTIVE | Noted: 2024-10-23

## 2024-10-30 PROCEDURE — 93010 ELECTROCARDIOGRAM REPORT: CPT | Mod: 76

## 2024-10-30 PROCEDURE — 76376 3D RENDER W/INTRP POSTPROCES: CPT | Mod: 26

## 2024-10-30 PROCEDURE — 93325 DOPPLER ECHO COLOR FLOW MAPG: CPT | Mod: 26,GC

## 2024-10-30 PROCEDURE — 93312 ECHO TRANSESOPHAGEAL: CPT | Mod: 26

## 2024-10-30 PROCEDURE — 93662 INTRACARDIAC ECG (ICE): CPT | Mod: 26

## 2024-10-30 PROCEDURE — 33340 PERQ CLSR TCAT L ATR APNDGE: CPT | Mod: Q0

## 2024-10-30 PROCEDURE — 71045 X-RAY EXAM CHEST 1 VIEW: CPT | Mod: 26

## 2024-10-30 PROCEDURE — 93320 DOPPLER ECHO COMPLETE: CPT | Mod: 26,GC

## 2024-10-30 RX ORDER — RANITIDINE HCL 300 MG
1 TABLET ORAL
Refills: 0 | DISCHARGE

## 2024-10-30 RX ORDER — RIVAROXABAN 20 MG/1
1 TABLET, FILM COATED ORAL
Qty: 0 | Refills: 0 | DISCHARGE

## 2024-10-30 RX ORDER — HYDROMORPHONE HCL/0.9% NACL/PF 6 MG/30 ML
0.5 PATIENT CONTROLLED ANALGESIA SYRINGE INTRAVENOUS
Refills: 0 | Status: DISCONTINUED | OUTPATIENT
Start: 2024-10-30 | End: 2024-10-30

## 2024-10-30 RX ORDER — METFORMIN HYDROCHLORIDE 500 MG/1
1 TABLET, EXTENDED RELEASE ORAL
Refills: 0 | DISCHARGE

## 2024-10-30 RX ORDER — VALSARTAN AND HYDROCHLOROTHIAZIDE 12.5; 8 MG/1; MG/1
1 TABLET, FILM COATED ORAL
Refills: 0 | DISCHARGE

## 2024-10-30 RX ORDER — SODIUM CHLORIDE 9 MG/ML
3 INJECTION, SOLUTION INTRAMUSCULAR; INTRAVENOUS; SUBCUTANEOUS EVERY 8 HOURS
Refills: 0 | Status: DISCONTINUED | OUTPATIENT
Start: 2024-10-30 | End: 2024-11-13

## 2024-10-30 RX ORDER — ONDANSETRON HYDROCHLORIDE 2 MG/ML
4 INJECTION, SOLUTION INTRAMUSCULAR; INTRAVENOUS ONCE
Refills: 0 | Status: DISCONTINUED | OUTPATIENT
Start: 2024-10-30 | End: 2024-10-30

## 2024-10-30 RX ORDER — FUROSEMIDE 40 MG
1 TABLET ORAL
Refills: 0 | DISCHARGE

## 2024-10-30 RX ORDER — METOPROLOL TARTRATE 50 MG
1 TABLET ORAL
Refills: 0 | DISCHARGE

## 2024-10-30 RX ORDER — OMEPRAZOLE 10 MG
1 CAPSULE,DELAYED RELEASE (ENTERIC COATED) ORAL
Refills: 0 | DISCHARGE

## 2024-10-30 NOTE — ASU DISCHARGE PLAN (ADULT/PEDIATRIC) - NS MD DC FALL RISK RISK
For information on Fall & Injury Prevention, visit: [email or web address] OR  [email or web address] OR  https://www.cdc.gov/steadi/patient.html Detail Level: Zone Include Location In Plan?: No

## 2024-10-30 NOTE — ASU DISCHARGE PLAN (ADULT/PEDIATRIC) - FINANCIAL ASSISTANCE
[facility] provides services at a reduced cost to those who are determined to be eligible through [facility]’s financial assistance program. Information regarding [facility]’s financial assistance program can be found by going to [email or web address] or by calling [phone].

## 2024-11-01 PROBLEM — Z87.19 PERSONAL HISTORY OF OTHER DISEASES OF THE DIGESTIVE SYSTEM: Chronic | Status: ACTIVE | Noted: 2024-10-23

## 2024-11-01 PROBLEM — I48.91 UNSPECIFIED ATRIAL FIBRILLATION: Chronic | Status: ACTIVE | Noted: 2024-10-23

## 2024-11-01 PROBLEM — Z92.89 PERSONAL HISTORY OF OTHER MEDICAL TREATMENT: Chronic | Status: ACTIVE | Noted: 2024-10-23

## 2024-11-15 ENCOUNTER — APPOINTMENT (OUTPATIENT)
Dept: ELECTROPHYSIOLOGY | Facility: CLINIC | Age: 89
End: 2024-11-15
Payer: MEDICARE

## 2024-11-15 ENCOUNTER — NON-APPOINTMENT (OUTPATIENT)
Age: 89
End: 2024-11-15

## 2024-11-15 VITALS
HEART RATE: 53 BPM | OXYGEN SATURATION: 96 % | HEIGHT: 65 IN | WEIGHT: 170 LBS | DIASTOLIC BLOOD PRESSURE: 81 MMHG | SYSTOLIC BLOOD PRESSURE: 155 MMHG | BODY MASS INDEX: 28.32 KG/M2

## 2024-11-15 PROCEDURE — 99213 OFFICE O/P EST LOW 20 MIN: CPT | Mod: 25

## 2024-11-15 PROCEDURE — 93000 ELECTROCARDIOGRAM COMPLETE: CPT

## 2024-12-16 ENCOUNTER — TRANSCRIPTION ENCOUNTER (OUTPATIENT)
Age: 89
End: 2024-12-16

## 2024-12-16 ENCOUNTER — OUTPATIENT (OUTPATIENT)
Dept: OUTPATIENT SERVICES | Facility: HOSPITAL | Age: 89
LOS: 1 days | End: 2024-12-16
Payer: MEDICARE

## 2024-12-16 ENCOUNTER — RESULT REVIEW (OUTPATIENT)
Age: 89
End: 2024-12-16

## 2024-12-16 VITALS
HEART RATE: 56 BPM | DIASTOLIC BLOOD PRESSURE: 87 MMHG | SYSTOLIC BLOOD PRESSURE: 145 MMHG | OXYGEN SATURATION: 98 % | RESPIRATION RATE: 16 BRPM

## 2024-12-16 VITALS — WEIGHT: 16.98 LBS | HEIGHT: 65 IN

## 2024-12-16 DIAGNOSIS — Z95.818 PRESENCE OF OTHER CARDIAC IMPLANTS AND GRAFTS: Chronic | ICD-10-CM

## 2024-12-16 DIAGNOSIS — Z98.49 CATARACT EXTRACTION STATUS, UNSPECIFIED EYE: Chronic | ICD-10-CM

## 2024-12-16 DIAGNOSIS — I48.19 OTHER PERSISTENT ATRIAL FIBRILLATION: ICD-10-CM

## 2024-12-16 PROBLEM — Z98.890 OTHER SPECIFIED POSTPROCEDURAL STATES: Chronic | Status: INACTIVE | Noted: 2024-10-23 | Resolved: 2024-12-16

## 2024-12-16 PROBLEM — Z87.19 PERSONAL HISTORY OF OTHER DISEASES OF THE DIGESTIVE SYSTEM: Chronic | Status: INACTIVE | Noted: 2024-10-23 | Resolved: 2024-12-16

## 2024-12-16 PROBLEM — I10 ESSENTIAL (PRIMARY) HYPERTENSION: Chronic | Status: INACTIVE | Noted: 2024-07-31 | Resolved: 2024-12-16

## 2024-12-16 PROBLEM — E78.5 HYPERLIPIDEMIA, UNSPECIFIED: Chronic | Status: INACTIVE | Noted: 2024-07-31 | Resolved: 2024-12-16

## 2024-12-16 PROBLEM — Z92.89 PERSONAL HISTORY OF OTHER MEDICAL TREATMENT: Chronic | Status: INACTIVE | Noted: 2024-10-23 | Resolved: 2024-12-16

## 2024-12-16 PROBLEM — I48.91 UNSPECIFIED ATRIAL FIBRILLATION: Chronic | Status: INACTIVE | Noted: 2024-10-23 | Resolved: 2024-12-16

## 2024-12-16 LAB — GLUCOSE BLDC GLUCOMTR-MCNC: 123 MG/DL — HIGH (ref 70–99)

## 2024-12-16 PROCEDURE — 93312 ECHO TRANSESOPHAGEAL: CPT | Mod: 26

## 2024-12-16 PROCEDURE — 76376 3D RENDER W/INTRP POSTPROCES: CPT | Mod: 26

## 2024-12-16 PROCEDURE — 93325 DOPPLER ECHO COLOR FLOW MAPG: CPT | Mod: 26,GC

## 2024-12-16 RX ORDER — SODIUM CHLORIDE 9 MG/ML
3 INJECTION, SOLUTION INTRAMUSCULAR; INTRAVENOUS; SUBCUTANEOUS EVERY 8 HOURS
Refills: 0 | Status: DISCONTINUED | OUTPATIENT
Start: 2024-12-16 | End: 2024-12-30

## 2024-12-16 RX ORDER — RANITIDINE HCL 150 MG
1 TABLET ORAL
Refills: 0 | DISCHARGE

## 2024-12-16 NOTE — ASU PATIENT PROFILE, ADULT - PATIENT/CAREGIVER OFFERED  INTERPRETER SERVICES
HPI:    Patient ID: Debbie Michele is a [de-identified]year old male. Toe Injury      82-year-old male presented to the  emergency room yesterday with chief complaint of toenail avulsion injury.   Patient states a month ago he dropped something on his right great to Constitutional: He appears well-developed and well-nourished. No distress. Cardiovascular: Normal rate, regular rhythm and normal heart sounds. No murmur heard. Pulmonary/Chest: Effort normal and breath sounds normal. No respiratory distress.  He has n yes

## 2024-12-16 NOTE — CHART NOTE - NSCHARTNOTEFT_GEN_A_CORE
Pt s/p KALEY. As per Dr. Muse, Watchman has a possible thrombus attached. Discussed with Dr. Jett, continue Eliquis and remainder of medications. Pt stable to be discharged home.

## 2024-12-16 NOTE — H&P CARDIOLOGY - NSICDXPASTSURGICALHX_GEN_ALL_CORE_FT
PAST SURGICAL HISTORY:  Presence of Watchman left atrial appendage closure device     S/P cataract surgery

## 2024-12-16 NOTE — ASU DISCHARGE PLAN (ADULT/PEDIATRIC) - CARE PROVIDER_API CALL
Frank Jett  Cardiovascular Disease  [address] [address] [place] [address], [address]  [place] [address]  Phone: [phone]  Fax: [phone]  Follow Up Time:

## 2024-12-16 NOTE — ASU DISCHARGE PLAN (ADULT/PEDIATRIC) - ASU DC SPECIAL INSTRUCTIONSFT
DIET:  - A mild sore throat is expected. If you are unable to swallow water, experience severe nausea, vomiting, coughing, bleeding, or fever, do not take anything else by mouth. Please call the office at [phone] immediately and ask to speak to a cardiologist. If you are unable to reach the office, please proceed to the nearest emergency room.    ACTIVITY:  For the next 24 hours:  - Do not drive or operate heavy machinery.  - Do not drink any alcoholic beverages.  - Do not make any important decisions or sign legal documents.  - Resume normal activity 24 hours post procedural completion.    MEDICATION:   - Take your medications as explained (see attached medication reconciliation on discharge paperwork).  - Continue Xarelto as prescribed until you follow up with Dr. Jett in the office.    ADDITIONAL INSTRUCTIONS:  - Call your doctor to make or confirm your follow up appointment.  - If you are unable to get in contact with your doctor, you may contact the [facility] at [phone].  - Please reference your discharge instructions for any questions or concerns.

## 2024-12-16 NOTE — ASU DISCHARGE PLAN (ADULT/PEDIATRIC) - NS MD DC FALL RISK RISK
For information on Fall & Injury Prevention, visit: [email or web address] OR  [email or web address] OR  https://www.cdc.gov/steadi/patient.html

## 2024-12-16 NOTE — ASU PATIENT PROFILE, ADULT - FALL HARM RISK - HARM RISK INTERVENTIONS

## 2024-12-16 NOTE — ASU DISCHARGE PLAN (ADULT/PEDIATRIC) - FINANCIAL ASSISTANCE
[facility] provides services at a reduced cost to those who are determined to be eligible through [facility]’s financial assistance program. Information regarding [facility]’s financial assistance program can be found by going to [email or web address] or by calling [phone].

## 2024-12-16 NOTE — H&P CARDIOLOGY - NSICDXPASTMEDICALHX_GEN_ALL_CORE_FT
PAST MEDICAL HISTORY:  AF (atrial fibrillation)     CVA (cerebrovascular accident)     DM (diabetes mellitus)     GERD (gastroesophageal reflux disease)     Gout     HLD (hyperlipidemia)     HTN (hypertension)

## 2024-12-16 NOTE — H&P CARDIOLOGY - HISTORY OF PRESENT ILLNESS
89 y/o Lithuanian speaking male with a PMHx of persistent atrial fibrillation s/p Watchman implantation (October 30, 2024) on Xarelto, CVA (no residual deficits), HTN, HLD, DM, GERD and gout presents for post Watchman transesophageal echocardiogram (KALEY).  ID # [id number] utilized; daughter Kimberley at bedside assisting with interpretation. Pt was hospitalized in July 2024 for GI bleed requiring 4 units pRBC. 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 Pt was able to be discharged on Xarelto without further episodes of bleeding. Pt followed up with EP given concern for long term use of Xarelto and subsequently underwent implantation of a Watchman device in October 2024. Pt has had no complications. Pt now presents for post Watchman KALEY to assess position of device. Pt denies fever, chills, recent travel, headache, dizziness, visual deficits, chest pain, shortness of breath, orthopnea, palpitations, abdominal pain, N/V/D/C, hematochezia, melena, dysuria, hematuria, LOC, syncope, peripheral edema.    NPO date and time: 12/15/2024 at 20:30  Mallampati: 2  Anticoagulation date and time? Xarelto taken this morning  Previous endoscopy? No    History of:  CVA? Yes  Sleep apnea? No  Dentures? Removed  Loose teeth? No    Patient denies:  Prior difficult intubation or airway problems  Odynophagia  Dysphagia  Esophageal stricture  Esophageal tumor  Esophageal varices  Esophageal perforation/laceration  Esophageal diverticulum  Large diaphragmatic hernia  Active or recent upper gastrointestinal (GI) bleed  History of GI surgery  History of esophageal surgery  History of Grier's esophagus  Tenuous cardiorespiratory status  Cervical spine arthritis with reduced range of motion or atlantoaxial joint disease  History of radiation to head, neck, or mediastinum  Severe thrombocytopenia    EP: Dr. Frank Jett

## 2024-12-20 ENCOUNTER — NON-APPOINTMENT (OUTPATIENT)
Age: 89
End: 2024-12-20

## 2024-12-20 ENCOUNTER — APPOINTMENT (OUTPATIENT)
Dept: ELECTROPHYSIOLOGY | Facility: CLINIC | Age: 89
End: 2024-12-20
Payer: MEDICARE

## 2024-12-20 VITALS
HEIGHT: 65 IN | OXYGEN SATURATION: 95 % | SYSTOLIC BLOOD PRESSURE: 166 MMHG | BODY MASS INDEX: 28.32 KG/M2 | WEIGHT: 170 LBS | DIASTOLIC BLOOD PRESSURE: 81 MMHG | HEART RATE: 51 BPM

## 2024-12-20 DIAGNOSIS — Z86.39 PERSONAL HISTORY OF OTHER ENDOCRINE, NUTRITIONAL AND METABOLIC DISEASE: ICD-10-CM

## 2024-12-20 DIAGNOSIS — I48.19 OTHER PERSISTENT ATRIAL FIBRILLATION: ICD-10-CM

## 2024-12-20 DIAGNOSIS — I10 ESSENTIAL (PRIMARY) HYPERTENSION: ICD-10-CM

## 2024-12-20 PROCEDURE — 99214 OFFICE O/P EST MOD 30 MIN: CPT | Mod: 25

## 2024-12-20 PROCEDURE — 93000 ELECTROCARDIOGRAM COMPLETE: CPT

## 2024-12-23 ENCOUNTER — APPOINTMENT (OUTPATIENT)
Dept: ELECTROPHYSIOLOGY | Facility: CLINIC | Age: 89
End: 2024-12-23

## 2025-01-27 ENCOUNTER — NON-APPOINTMENT (OUTPATIENT)
Age: 89
End: 2025-01-27

## 2025-01-27 DIAGNOSIS — T82.867A THROMBOSIS DUE CARDIAC PROSTHETIC DEVICES, IMPLANTS AND GRAFTS, INITIAL ENCOUNTER: ICD-10-CM

## 2025-02-04 PROBLEM — I48.91 UNSPECIFIED ATRIAL FIBRILLATION: Chronic | Status: ACTIVE | Noted: 2024-12-16

## 2025-02-04 PROBLEM — E78.5 HYPERLIPIDEMIA, UNSPECIFIED: Chronic | Status: ACTIVE | Noted: 2024-12-16

## 2025-02-04 PROBLEM — I10 ESSENTIAL (PRIMARY) HYPERTENSION: Chronic | Status: ACTIVE | Noted: 2024-12-16

## 2025-02-04 PROBLEM — K21.9 GASTRO-ESOPHAGEAL REFLUX DISEASE WITHOUT ESOPHAGITIS: Chronic | Status: ACTIVE | Noted: 2024-12-16

## 2025-02-04 PROBLEM — E11.9 TYPE 2 DIABETES MELLITUS WITHOUT COMPLICATIONS: Chronic | Status: ACTIVE | Noted: 2024-12-16

## 2025-02-21 ENCOUNTER — OUTPATIENT (OUTPATIENT)
Dept: OUTPATIENT SERVICES | Facility: HOSPITAL | Age: 89
LOS: 1 days | End: 2025-02-21
Payer: MEDICARE

## 2025-02-21 ENCOUNTER — APPOINTMENT (OUTPATIENT)
Dept: CT IMAGING | Facility: IMAGING CENTER | Age: 89
End: 2025-02-21
Payer: MEDICARE

## 2025-02-21 ENCOUNTER — RESULT REVIEW (OUTPATIENT)
Age: 89
End: 2025-02-21

## 2025-02-21 DIAGNOSIS — Z98.49 CATARACT EXTRACTION STATUS, UNSPECIFIED EYE: Chronic | ICD-10-CM

## 2025-02-21 DIAGNOSIS — Z95.818 PRESENCE OF OTHER CARDIAC IMPLANTS AND GRAFTS: Chronic | ICD-10-CM

## 2025-02-21 DIAGNOSIS — I48.19 OTHER PERSISTENT ATRIAL FIBRILLATION: ICD-10-CM

## 2025-02-21 DIAGNOSIS — T82.867A THROMBOSIS DUE TO CARDIAC PROSTHETIC DEVICES, IMPLANTS AND GRAFTS, INITIAL ENCOUNTER: ICD-10-CM

## 2025-02-21 PROCEDURE — 75574 CT ANGIO HRT W/3D IMAGE: CPT | Mod: 26

## 2025-02-21 PROCEDURE — 75574 CT ANGIO HRT W/3D IMAGE: CPT

## 2025-02-24 ENCOUNTER — NON-APPOINTMENT (OUTPATIENT)
Age: 89
End: 2025-02-24

## 2025-02-24 RX ORDER — ASPIRIN ENTERIC COATED TABLETS 81 MG 81 MG/1
81 TABLET, DELAYED RELEASE ORAL DAILY
Refills: 0 | Status: ACTIVE | COMMUNITY
Start: 2025-02-24

## (undated) DEVICE — TUBING MEDI-VAC W MAXIGRIP CONNECTORS 1/4"X6'

## (undated) DEVICE — TUBING IV SET GRAVITY 3Y 100" MACRO

## (undated) DEVICE — CATH IV SAFE BC 22G X 1" (BLUE)

## (undated) DEVICE — BIOPSY FORCEP COLD DISP

## (undated) DEVICE — TUBING ENDO EXT OLYMPUS 160 24HR USE GI

## (undated) DEVICE — SALIVA EJECTOR (BLUE)

## (undated) DEVICE — DRSG 2X2

## (undated) DEVICE — TUBING SUCTION NONCONDUCTIVE 6MM X 12FT

## (undated) DEVICE — GOWN LG

## (undated) DEVICE — LUBRICATING JELLY HR ONE SHOT 3G

## (undated) DEVICE — BASIN EMESIS 10IN GRADUATED MAUVE

## (undated) DEVICE — SOLIDIFIER ISOLYZER 2000 CC

## (undated) DEVICE — CONTAINER FORMALIN 10% 20ML

## (undated) DEVICE — KIT ENDO PROCEDURE CUST W/VLV

## (undated) DEVICE — ELCTR ECG CONDUCTIVE ADHESIVE

## (undated) DEVICE — DRSG BANDAID 0.75X3"

## (undated) DEVICE — PACK IV START WITH CHG

## (undated) DEVICE — ELCTR GROUNDING PAD ADULT COVIDIEN

## (undated) DEVICE — BIOPSY FORCEP RADIAL JAW 4 STANDARD WITH NEEDLE

## (undated) DEVICE — DRSG CURITY GAUZE SPONGE 4 X 4" 12-PLY NON-STERILE